# Patient Record
Sex: MALE | Race: WHITE | Employment: FULL TIME | URBAN - METROPOLITAN AREA
[De-identification: names, ages, dates, MRNs, and addresses within clinical notes are randomized per-mention and may not be internally consistent; named-entity substitution may affect disease eponyms.]

---

## 2017-01-23 ENCOUNTER — ANTICOAGULATION THERAPY VISIT (OUTPATIENT)
Dept: NURSING | Facility: CLINIC | Age: 52
End: 2017-01-23
Payer: COMMERCIAL

## 2017-01-23 DIAGNOSIS — I26.99 OTHER PULMONARY EMBOLISM WITHOUT ACUTE COR PULMONALE, UNSPECIFIED CHRONICITY (H): ICD-10-CM

## 2017-01-23 DIAGNOSIS — I26.99 PULMONARY EMBOLISM (H): ICD-10-CM

## 2017-01-23 DIAGNOSIS — Z79.01 LONG-TERM (CURRENT) USE OF ANTICOAGULANTS: Primary | ICD-10-CM

## 2017-01-23 LAB — INR POINT OF CARE: 2.4 (ref 0.86–1.14)

## 2017-01-23 PROCEDURE — 99207 ZZC NO CHARGE NURSE ONLY: CPT

## 2017-01-23 PROCEDURE — 36416 COLLJ CAPILLARY BLOOD SPEC: CPT

## 2017-01-23 PROCEDURE — 85610 PROTHROMBIN TIME: CPT | Mod: QW

## 2017-01-23 RX ORDER — WARFARIN SODIUM 5 MG/1
TABLET ORAL
Qty: 135 TABLET | Refills: 0 | Status: SHIPPED | OUTPATIENT
Start: 2017-01-23 | End: 2017-06-22

## 2017-01-23 NOTE — MR AVS SNAPSHOT
Arslan Clinton   1/23/2017 7:15 AM   Anticoagulation Therapy Visit    Description:  52 year old male   Provider:  AN ANTI COAG   Department:  An Nurse           INR as of 1/23/2017     Selected INR 2.4 (1/23/2017)      Anticoagulation Summary as of 1/23/2017     INR goal 2.0-3.0   Selected INR 2.4 (1/23/2017)   Full instructions 5 mg on Mon, Wed, Fri; 7.5 mg all other days   Next INR check 2/27/2017    Indications   Pulmonary embolism (H) [I26.99]  Long-term (current) use of anticoagulants [Z79.01] [Z79.01]         Your next Anticoagulation Clinic appointment(s)     Feb 27, 2017  7:15 AM   Anticoagulation Visit with AN ANTI [facility]AG   [facility]    [address] [facility] [address] [phone]              Contact Numbers     [facility]  Please call  [phone] to cancel and/or reschedule your appointment, or with any problems or questions regarding your therapy.        January 2017 Details    Sun Mon Tue Wed Thu Fri Sat     1               2               3               4               5               6               7                 8               9               10               11               12               13               14                 15               16               17               18               19               20               21                 22               23      5 mg   See details      24      7.5 mg         25      5 mg         26      7.5 mg         27      5 mg         28      7.5 mg           29      7.5 mg         30      5 mg         31      7.5 mg              Date Details   01/23 This INR check               How to take your warfarin dose     To take:  5 mg Take 1 of the 5 mg tablets.    To take:  7.5 mg Take 1.5 of the 5 mg tablets.           February 2017 Details    Sun Mon Tue Wed Thu Fri Sat        1      5 mg         2      7.5 mg         3      5 mg         4      7.5 mg           5      7.5 mg         6       5 mg         7      7.5 mg         8      5 mg         9      7.5 mg         10      5 mg         11      7.5 mg           12      7.5 mg         13      5 mg         14      7.5 mg         15      5 mg         16      7.5 mg         17      5 mg         18      7.5 mg           19      7.5 mg         20      5 mg         21      7.5 mg         22      5 mg         23      7.5 mg         24      5 mg         25      7.5 mg           26      7.5 mg         27            28                    Date Details   No additional details    Date of next INR:  2/27/2017         How to take your warfarin dose     To take:  5 mg Take 1 of the 5 mg tablets.    To take:  7.5 mg Take 1.5 of the 5 mg tablets.

## 2017-01-23 NOTE — PROGRESS NOTES
ANTICOAGULATION FOLLOW-UP CLINIC VISIT    Patient Name:  Arslan Clinton  Date:  1/23/2017  Contact Type:  Face to Face    SUBJECTIVE:     Patient Findings     Positives No Problem Findings           OBJECTIVE    INR PROTIME   Date Value Ref Range Status   01/23/2017 2.4* 0.86 - 1.14 Final       ASSESSMENT / PLAN  INR assessment THER    Recheck INR In: 5 WEEKS    INR Location Clinic      Anticoagulation Summary as of 1/23/2017     INR goal 2.0-3.0   Selected INR 2.4 (1/23/2017)   Maintenance plan 5 mg (5 mg x 1) on Mon, Wed, Fri; 7.5 mg (5 mg x 1.5) all other days   Full instructions 5 mg on Mon, Wed, Fri; 7.5 mg all other days   Weekly total 45 mg   No change documented Tierra Olivarez RN   Plan last modified Tierra Olivarez RN (10/7/2016)   Next INR check 2/27/2017   Target end date     Indications   Pulmonary embolism (H) [I26.99]  Long-term (current) use of anticoagulants [Z79.01] [Z79.01]         Anticoagulation Episode Summary     INR check location     Preferred lab     Send INR reminders to AN ANTICOAG CLINIC    Comments       Anticoagulation Care Providers     Provider Role Specialty Phone number    Cedric Hodgson PA-C [facility] [phone]            See the Encounter Report to view Anticoagulation Flowsheet and Dosing Calendar (Go to Encounters tab in chart review, and find the Anticoagulation Therapy Visit)        Tierra Olivarez RN

## 2017-02-27 ENCOUNTER — ANTICOAGULATION THERAPY VISIT (OUTPATIENT)
Dept: NURSING | Facility: CLINIC | Age: 52
End: 2017-02-27
Payer: COMMERCIAL

## 2017-02-27 DIAGNOSIS — Z79.01 LONG-TERM (CURRENT) USE OF ANTICOAGULANTS: ICD-10-CM

## 2017-02-27 DIAGNOSIS — I26.99 OTHER PULMONARY EMBOLISM WITHOUT ACUTE COR PULMONALE, UNSPECIFIED CHRONICITY (H): ICD-10-CM

## 2017-02-27 LAB — INR POINT OF CARE: 2.2 (ref 0.86–1.14)

## 2017-02-27 PROCEDURE — 99207 ZZC NO CHARGE NURSE ONLY: CPT

## 2017-02-27 PROCEDURE — 36416 COLLJ CAPILLARY BLOOD SPEC: CPT

## 2017-02-27 PROCEDURE — 85610 PROTHROMBIN TIME: CPT | Mod: QW

## 2017-02-27 NOTE — MR AVS SNAPSHOT
Arslan Clinton   2/27/2017 7:15 AM   Anticoagulation Therapy Visit    Description:  52 year old male   Provider:  AN ANTI COAG   Department:  An Nurse           INR as of 2/27/2017     Today's INR 2.2      Anticoagulation Summary as of 2/27/2017     INR goal 2.0-3.0   Today's INR 2.2   Full instructions 5 mg on Mon, Wed, Fri; 7.5 mg all other days   Next INR check 4/3/2017    Indications   Pulmonary embolism (H) [I26.99]  Long-term (current) use of anticoagulants [Z79.01] [Z79.01]         Your next Anticoagulation Clinic appointment(s)     Apr 03, 2017  7:15 AM CDT   Anticoagulation Visit with AN ANTI COAG   [facility]    [address] [facility] [address] [phone]              Contact Numbers     [facility]  Please call  [phone] to cancel and/or reschedule your appointment, or with any problems or questions regarding your therapy.        February 2017 Details    Sun Mon Tue Wed Thu Fri Sat        1               2               3               4                 5               6               7               8               9               10               11                 12               13               14               15               16               17               18                 19               20               21               22               23               24               25                 26               27      5 mg   See details      28      7.5 mg              Date Details   02/27 This INR check               How to take your warfarin dose     To take:  5 mg Take 1 of the 5 mg tablets.    To take:  7.5 mg Take 1.5 of the 5 mg tablets.           March 2017 Details    Sun Mon Tue Wed Thu Fri Sat        1      5 mg         2      7.5 mg         3      5 mg         4      7.5 mg           5      7.5 mg         6      5 mg         7      7.5 mg         8      5 mg         9      7.5 mg         10      5 mg         11       7.5 mg           12      7.5 mg         13      5 mg         14      7.5 mg         15      5 mg         16      7.5 mg         17      5 mg         18      7.5 mg           19      7.5 mg         20      5 mg         21      7.5 mg         22      5 mg         23      7.5 mg         24      5 mg         25      7.5 mg           26      7.5 mg         27      5 mg         28      7.5 mg         29      5 mg         30      7.5 mg         31      5 mg           Date Details   No additional details            How to take your warfarin dose     To take:  5 mg Take 1 of the 5 mg tablets.    To take:  7.5 mg Take 1.5 of the 5 mg tablets.           April 2017 Details    Sun Mon Tue Wed Thu Fri Sat           1      7.5 mg           2      7.5 mg         3            4               5               6               7               8                 9               10               11               12               13               14               15                 16               17               18               19               20               21               22                 23               24               25               26               27               28               29                 30                      Date Details   No additional details    Date of next INR:  4/3/2017         How to take your warfarin dose     To take:  5 mg Take 1 of the 5 mg tablets.    To take:  7.5 mg Take 1.5 of the 5 mg tablets.

## 2017-02-27 NOTE — PROGRESS NOTES
ANTICOAGULATION FOLLOW-UP CLINIC VISIT    Patient Name:  Arslan Clinton  Date:  2/27/2017  Contact Type:  Face to Face    SUBJECTIVE:     Patient Findings     Positives No Problem Findings    Comments Therapeutic. Continue same.           OBJECTIVE    INR Protime   Date Value Ref Range Status   02/27/2017 2.2 (A) 0.86 - 1.14 Final       ASSESSMENT / PLAN  INR assessment THER    Recheck INR In: 5 WEEKS    INR Location Clinic      Anticoagulation Summary as of 2/27/2017     INR goal 2.0-3.0   Today's INR 2.2   Maintenance plan 5 mg (5 mg x 1) on Mon, Wed, Fri; 7.5 mg (5 mg x 1.5) all other days   Full instructions 5 mg on Mon, Wed, Fri; 7.5 mg all other days   Weekly total 45 mg   No change documented Tierra Olivarez RN   Plan last modified Tierra Olivarez RN (10/7/2016)   Next INR check 4/3/2017   Target end date     Indications   Pulmonary embolism (H) [I26.99]  Long-term (current) use of anticoagulants [Z79.01] [Z79.01]         Anticoagulation Episode Summary     INR check location     Preferred lab     Send INR reminders to AN ANTICOAG CLINIC    Comments       Anticoagulation Care Providers     Provider Role Specialty Phone number    Cedric Hodgson PA-C [facility] [phone]            See the Encounter Report to view Anticoagulation Flowsheet and Dosing Calendar (Go to Encounters tab in chart review, and find the Anticoagulation Therapy Visit)        Tierra Olivarez RN

## 2017-03-01 DIAGNOSIS — M54.2 PAIN, NECK: ICD-10-CM

## 2017-03-01 DIAGNOSIS — R56.9 CONVULSIONS (H): ICD-10-CM

## 2017-03-01 DIAGNOSIS — R41.3 MEMORY LOSS: ICD-10-CM

## 2017-03-01 DIAGNOSIS — R20.2 HAND PARESTHESIA: Primary | ICD-10-CM

## 2017-03-01 PROCEDURE — 99000 SPECIMEN HANDLING OFFICE-LAB: CPT | Performed by: PSYCHIATRY & NEUROLOGY

## 2017-03-01 PROCEDURE — 80186 ASSAY OF PHENYTOIN FREE: CPT | Mod: 90 | Performed by: PSYCHIATRY & NEUROLOGY

## 2017-03-01 PROCEDURE — 36415 COLL VENOUS BLD VENIPUNCTURE: CPT | Performed by: PSYCHIATRY & NEUROLOGY

## 2017-03-02 LAB — PHENYTOIN FREE SERPL-MCNC: 1.45 UG/ML

## 2017-04-03 ENCOUNTER — ANTICOAGULATION THERAPY VISIT (OUTPATIENT)
Dept: NURSING | Facility: CLINIC | Age: 52
End: 2017-04-03
Payer: COMMERCIAL

## 2017-04-03 DIAGNOSIS — I26.99 OTHER PULMONARY EMBOLISM WITHOUT ACUTE COR PULMONALE, UNSPECIFIED CHRONICITY (H): ICD-10-CM

## 2017-04-03 DIAGNOSIS — Z79.01 LONG-TERM (CURRENT) USE OF ANTICOAGULANTS: ICD-10-CM

## 2017-04-03 LAB — INR POINT OF CARE: 2.6 (ref 0.86–1.14)

## 2017-04-03 PROCEDURE — 36416 COLLJ CAPILLARY BLOOD SPEC: CPT

## 2017-04-03 PROCEDURE — 85610 PROTHROMBIN TIME: CPT | Mod: QW

## 2017-04-03 PROCEDURE — 99207 ZZC NO CHARGE NURSE ONLY: CPT

## 2017-04-03 NOTE — MR AVS SNAPSHOT
Arslan Clinton   4/3/2017 7:15 AM   Anticoagulation Therapy Visit    Description:  52 year old male   Provider:  AN ANTI COAG   Department:  An Nurse           INR as of 4/3/2017     Today's INR 2.6      Anticoagulation Summary as of 4/3/2017     INR goal 2.0-3.0   Today's INR 2.6   Full instructions 5 mg on Mon, Wed, Fri; 7.5 mg all other days   Next INR check 5/5/2017    Indications   Pulmonary embolism (H) [I26.99]  Long-term (current) use of anticoagulants [Z79.01] [Z79.01]         Your next Anticoagulation Clinic appointment(s)     Apr 03, 2017  7:15 AM CDT   Anticoagulation Visit with AN ANTI COAG   [facility] ([facility]    [address] [facility] [address] [phone]            May 05, 2017  7:15 AM CDT   Anticoagulation Visit with AN ANTI COAG   [facility] ([facility])    [address] [facility] [address] [phone]              Contact Numbers     [facility]  Please call  [phone] to cancel and/or reschedule your appointment, or with any problems or questions regarding your therapy.        April 2017 Details    Sun Mon Tue Wed Thu Fri Sat           1                 2               3      5 mg   See details      4      7.5 mg         5      5 mg         6      7.5 mg         7      5 mg         8      7.5 mg           9      7.5 mg         10      5 mg         11      7.5 mg         12      5 mg         13      7.5 mg         14      5 mg         15      7.5 mg           16      7.5 mg         17      5 mg         18      7.5 mg         19      5 mg         20      7.5 mg         21      5 mg         22      7.5 mg           23      7.5 mg         24      5 mg         25      7.5 mg         26      5 mg         27      7.5 mg         28      5 mg         29      7.5 mg           30      7.5 mg                Date Details   04/03 This INR check               How to take your warfarin dose     To take:  5 mg  Take 1 of the 5 mg tablets.    To take:  7.5 mg Take 1.5 of the 5 mg tablets.           May 2017 Details    Sun Mon Tue Wed Thu Fri Sat      1      5 mg         2      7.5 mg         3      5 mg         4      7.5 mg         5            6                 7               8               9               10               11               12               13                 14               15               16               17               18               19               20                 21               22               23               24               25               26               27                 28               29               30               31                   Date Details   No additional details    Date of next INR:  5/5/2017         How to take your warfarin dose     To take:  5 mg Take 1 of the 5 mg tablets.    To take:  7.5 mg Take 1.5 of the 5 mg tablets.

## 2017-04-03 NOTE — PROGRESS NOTES
ANTICOAGULATION FOLLOW-UP CLINIC VISIT    Patient Name:  Arslan Clinton  Date:  4/3/2017  Contact Type:  Face to Face    SUBJECTIVE:     Patient Findings     Positives No Problem Findings           OBJECTIVE    INR Protime   Date Value Ref Range Status   04/03/2017 2.6 (A) 0.86 - 1.14 Final       ASSESSMENT / PLAN  INR assessment THER    Recheck INR In: 5 WEEKS    INR Location Clinic      Anticoagulation Summary as of 4/3/2017     INR goal 2.0-3.0   Today's INR 2.6   Maintenance plan 5 mg (5 mg x 1) on Mon, Wed, Fri; 7.5 mg (5 mg x 1.5) all other days   Full instructions 5 mg on Mon, Wed, Fri; 7.5 mg all other days   Weekly total 45 mg   No change documented Tierra Olivarez RN   Plan last modified Tierra Olivarez RN (10/7/2016)   Next INR check 5/5/2017   Target end date     Indications   Pulmonary embolism (H) [I26.99]  Long-term (current) use of anticoagulants [Z79.01] [Z79.01]         Anticoagulation Episode Summary     INR check location     Preferred lab     Send INR reminders to AN ANTICOAG CLINIC    Comments       Anticoagulation Care Providers     Provider Role Specialty Phone number    Cedric Hodgson PA-C [facility] [phone]            See the Encounter Report to view Anticoagulation Flowsheet and Dosing Calendar (Go to Encounters tab in chart review, and find the Anticoagulation Therapy Visit)        Tierra Olivarez RN

## 2017-04-10 ENCOUNTER — TELEPHONE (OUTPATIENT)
Dept: FAMILY MEDICINE | Facility: CLINIC | Age: 52
End: 2017-04-10

## 2017-04-10 NOTE — TELEPHONE ENCOUNTER
Patient has been having shortness of breath and also feeling dizzy/light headed.   Please advise.  Thank you

## 2017-04-10 NOTE — TELEPHONE ENCOUNTER
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  States in last couple weeks he's noticed that he would move (a slight move) and become very short of breath and feel like he was going to pass out but didn't.  This weekend was helping someone move some \"stuff\" and noticed he'd have to stop and rest due to shortness of breath.   He states he is now sick with nasal congestion but he's had the intermittent shortness of breath long term.  Had noticed more so in Winter time or earlier in Spring.  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  He says had had PE prior but had gone off his warfarin.   Has not stopped his warfarin.  Recent INR's have been in normal.  He states doesn't feel like when he's had a PE in past.  He says it is not constant.  Walked the other night with no breathing issues.  He says has had times where it will last a couple innings; other times more briefly.  He is not having any chest pains.  Denies any issues with racing, fluttering, or irregular heartbeats.  Then states had been cardioverted when in hospital with PE.  States had atrial flutter.  Refuses ED.  States can't come in tomorrow due to other obligations.  Appointment Wednesday with Cedric Hodgson PA-C.  Says will go to ED if has recurrence or worsening of symptoms.  Kylah Severino RN    "

## 2017-04-11 NOTE — PROGRESS NOTES
SUBJECTIVE:                                                    Arslan Clinton is a 52 year old male who presents to clinic today for the following health issues:    Shortness of breath      Duration: over the last year - happening more often recently     Description (location/character/radiation): gets short of breath    Intensity:  Variable     Accompanying signs and symptoms: light headed and feels like he is going to pass out at times    History (similar episodes/previous evaluation): pt has history of blood clot - worried this is happening again. Had pain in his right thigh yesterday - was sitting a lot      Precipitating or alleviating factors: None    Therapies tried and outcome: has to take deep breaths in    History of PE and is on coumadin. History of atrial flutter at time of diagnosis. States he was cardioverted into normal rhythm. No symptoms since.  Gradual onset of short of breath and dizziness. 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 Or sometime when moving boxes and exerting himself. No chest pains.     History of seizures, see's neurology.  History of thyroid nodule with US 2015. He didn't recall having that done.   Was to follow up with ENT which he didn't .  Care Everywhere Reviewed    Problem list and histories reviewed & adjusted, as indicated.  Additional history: as documented    Patient Active Problem List   Diagnosis     Other acquired deformity of other parts of limb     CARDIOVASCULAR SCREENING; LDL GOAL LESS THAN 160     Tremor     Weight loss     Varicose veins of lower extremities with complications     Personal history of venous thrombosis and embolism     Right leg pain     Superficial thrombophlebitis     Pulmonary embolism (H)     Thyroid nodule     Thyroid cyst     Family history of colon cancer     Long-term (current) use of anticoagulants [Z79.01]     Other pulmonary embolism without acute cor pulmonale, unspecified chronicity (H)      Atrial flutter, unspecified type (H)     Convulsions, unspecified convulsion type (H)     Past Surgical History:   Procedure Laterality Date     ARTHROSCOPY KNEE RT/LT       HERNIA REPAIR, INGUINAL RT/LT      Hernia Repair, RT/LT       Social History   Substance Use Topics     Smoking status: Never Smoker     Smokeless tobacco: Never Used     Alcohol use Yes      Comment: rare     Family History   Problem Relation Age of Onset     HEART DISEASE Mother      Cancer - colorectal Father      late 60's     Thyroid Disease Father      HEART DISEASE Other      CANCER Other      HEART DISEASE Sister          Current Outpatient Prescriptions   Medication Sig Dispense Refill     fluticasone (FLONASE) 50 MCG/ACT spray Spray 1 spray into both nostrils daily       metoprolol (TOPROL-XL) 25 MG 24 hr tablet Take 1 tablet (25 mg) by mouth daily 30 tablet 1     warfarin (COUMADIN) 5 MG tablet Take daily as directed. Current dose 5 mg M,W,F and  7.5 mg Su,Tu,TH,Sa. 135 tablet 0     Acetaminophen (TYLENOL EXTRA STRENGTH PO) Take 2 tablets by mouth every 6 hours as needed       phenytoin (DILANTIN) 100 MG ER capsule Take 5 capsules by mouth daily.       Allergies   Allergen Reactions     Penicillins      BP Readings from Last 3 Encounters:   04/12/17 99/76   03/24/16 118/76   02/13/15 108/78    Wt Readings from Last 3 Encounters:   04/12/17 193 lb (87.5 kg)   03/24/16 193 lb (87.5 kg)   02/13/15 184 lb (83.5 kg)            Labs reviewed in EPIC    ROS:  Constitutional, HEENT, cardiovascular, pulmonary, gi and gu systems are negative, except as otherwise noted.    OBJECTIVE:                                                    BP 99/76  Pulse 109  Wt 193 lb (87.5 kg)  SpO2 98%  BMI 29.13 kg/m2  Body mass index is 29.13 kg/(m^2).  GENERAL: healthy, alert and no distress  EYES: Eyes grossly normal to inspection, PERRL and conjunctivae and sclerae normal  HENT: ear canals and TM's normal, nose and mouth without ulcers or lesions  NECK:  no adenopathy, no asymmetry, masses, or scars and thyroid left enlarged nodule to palpation  RESP: lungs clear to auscultation - no rales, rhonchi or wheezes  CV:  irregular rate and rhythm, tachycardic,  normal S1 S2, no S3 or S4, no murmur, click or rub, no peripheral edema and peripheral pulses strong  ABDOMEN: soft, nontender, no hepatosplenomegaly, no masses and bowel sounds normal  MS: no gross musculoskeletal defects noted, no edema    Diagnostic Test Results:  Results for orders placed or performed in visit on 04/12/17 (from the past 24 hour(s))   CBC with platelets differential   Result Value Ref Range    WBC 6.3 4.0 - 11.0 10e9/L    RBC Count 5.13 4.4 - 5.9 10e12/L    Hemoglobin 15.1 13.3 - 17.7 g/dL    Hematocrit 44.9 40.0 - 53.0 %    MCV 88 78 - 100 fl    MCH 29.4 26.5 - 33.0 pg    MCHC 33.6 31.5 - 36.5 g/dL    RDW 13.2 10.0 - 15.0 %    Platelet Count 213 150 - 450 10e9/L    Diff Method Automated Method     % Neutrophils 57.8 %    % Lymphocytes 31.1 %    % Monocytes 10.1 %    % Eosinophils 0.8 %    % Basophils 0.2 %    Absolute Neutrophil 3.7 1.6 - 8.3 10e9/L    Absolute Lymphocytes 2.0 0.8 - 5.3 10e9/L    Absolute Monocytes 0.6 0.0 - 1.3 10e9/L    Absolute Eosinophils 0.1 0.0 - 0.7 10e9/L    Absolute Basophils 0.0 0.0 - 0.2 10e9/L     EKG - atrial flutter        ASSESSMENT/PLAN:                                                        ICD-10-CM    1. SOB (shortness of breath) R06.02 CBC with platelets differential     Comprehensive metabolic panel     US Thyroid     EKG 12-lead complete w/read - Clinics     CARDIOLOGY EVAL ADULT REFERRAL   2. Dizziness R42 CBC with platelets differential     Comprehensive metabolic panel     US Thyroid     CARDIOLOGY EVAL ADULT REFERRAL   3. Thyroid nodule E04.1 TSH with free T4 reflex     OTOLARYNGOLOGY REFERRAL   4. Personal history of venous thrombosis and embolism Z86.718    5. Long-term (current) use of anticoagulants [Z79.01] Z79.01    6. Atrial flutter, unspecified  type (H) I48.92 CARDIOLOGY EVAL ADULT REFERRAL     metoprolol (TOPROL-XL) 25 MG 24 hr tablet   7. Convulsions, unspecified convulsion type (H) R56.9    start metoprolol 25 mg daily.   warning signs discussed. side effects discussed  If symptoms increase go to ED.  Follow up  With cardiology 1 wk.   Get US updated on thyroid, labs pending. Follow up  With ENT.  Discussed with Dr. Zhen Hodgson PA-C  [facility]

## 2017-04-12 ENCOUNTER — OFFICE VISIT (OUTPATIENT)
Dept: FAMILY MEDICINE | Facility: CLINIC | Age: 52
End: 2017-04-12
Payer: COMMERCIAL

## 2017-04-12 VITALS
DIASTOLIC BLOOD PRESSURE: 76 MMHG | OXYGEN SATURATION: 98 % | HEART RATE: 109 BPM | WEIGHT: 193 LBS | BODY MASS INDEX: 29.13 KG/M2 | SYSTOLIC BLOOD PRESSURE: 99 MMHG

## 2017-04-12 DIAGNOSIS — R56.9 CONVULSIONS, UNSPECIFIED CONVULSION TYPE (H): ICD-10-CM

## 2017-04-12 DIAGNOSIS — I48.92 ATRIAL FLUTTER, UNSPECIFIED TYPE (H): ICD-10-CM

## 2017-04-12 DIAGNOSIS — Z79.01 LONG-TERM (CURRENT) USE OF ANTICOAGULANTS: ICD-10-CM

## 2017-04-12 DIAGNOSIS — R06.02 SOB (SHORTNESS OF BREATH): Primary | ICD-10-CM

## 2017-04-12 DIAGNOSIS — E04.1 THYROID NODULE: ICD-10-CM

## 2017-04-12 DIAGNOSIS — Z86.718 PERSONAL HISTORY OF VENOUS THROMBOSIS AND EMBOLISM: ICD-10-CM

## 2017-04-12 DIAGNOSIS — R42 DIZZINESS: ICD-10-CM

## 2017-04-12 LAB
ALBUMIN SERPL-MCNC: 4.1 G/DL (ref 3.4–5)
ALP SERPL-CCNC: 85 U/L (ref 40–150)
ALT SERPL W P-5'-P-CCNC: 42 U/L (ref 0–70)
ANION GAP SERPL CALCULATED.3IONS-SCNC: 8 MMOL/L (ref 3–14)
AST SERPL W P-5'-P-CCNC: 24 U/L (ref 0–45)
BASOPHILS # BLD AUTO: 0 10E9/L (ref 0–0.2)
BASOPHILS NFR BLD AUTO: 0.2 %
BILIRUB SERPL-MCNC: 0.4 MG/DL (ref 0.2–1.3)
BUN SERPL-MCNC: 15 MG/DL (ref 7–30)
CALCIUM SERPL-MCNC: 9 MG/DL (ref 8.5–10.1)
CHLORIDE SERPL-SCNC: 104 MMOL/L (ref 94–109)
CO2 SERPL-SCNC: 27 MMOL/L (ref 20–32)
CREAT SERPL-MCNC: 1.07 MG/DL (ref 0.66–1.25)
DIFFERENTIAL METHOD BLD: NORMAL
EOSINOPHIL # BLD AUTO: 0.1 10E9/L (ref 0–0.7)
EOSINOPHIL NFR BLD AUTO: 0.8 %
ERYTHROCYTE [DISTWIDTH] IN BLOOD BY AUTOMATED COUNT: 13.2 % (ref 10–15)
GFR SERPL CREATININE-BSD FRML MDRD: 72 ML/MIN/1.7M2
GLUCOSE SERPL-MCNC: 89 MG/DL (ref 70–99)
HCT VFR BLD AUTO: 44.9 % (ref 40–53)
HGB BLD-MCNC: 15.1 G/DL (ref 13.3–17.7)
LYMPHOCYTES # BLD AUTO: 2 10E9/L (ref 0.8–5.3)
LYMPHOCYTES NFR BLD AUTO: 31.1 %
MCH RBC QN AUTO: 29.4 PG (ref 26.5–33)
MCHC RBC AUTO-ENTMCNC: 33.6 G/DL (ref 31.5–36.5)
MCV RBC AUTO: 88 FL (ref 78–100)
MONOCYTES # BLD AUTO: 0.6 10E9/L (ref 0–1.3)
MONOCYTES NFR BLD AUTO: 10.1 %
NEUTROPHILS # BLD AUTO: 3.7 10E9/L (ref 1.6–8.3)
NEUTROPHILS NFR BLD AUTO: 57.8 %
PLATELET # BLD AUTO: 213 10E9/L (ref 150–450)
POTASSIUM SERPL-SCNC: 4.2 MMOL/L (ref 3.4–5.3)
PROT SERPL-MCNC: 7.8 G/DL (ref 6.8–8.8)
RBC # BLD AUTO: 5.13 10E12/L (ref 4.4–5.9)
SODIUM SERPL-SCNC: 139 MMOL/L (ref 133–144)
TSH SERPL DL<=0.005 MIU/L-ACNC: 1.24 MU/L (ref 0.4–4)
WBC # BLD AUTO: 6.3 10E9/L (ref 4–11)

## 2017-04-12 PROCEDURE — 93000 ELECTROCARDIOGRAM COMPLETE: CPT | Performed by: PHYSICIAN ASSISTANT

## 2017-04-12 PROCEDURE — 80050 GENERAL HEALTH PANEL: CPT | Performed by: PHYSICIAN ASSISTANT

## 2017-04-12 PROCEDURE — 36415 COLL VENOUS BLD VENIPUNCTURE: CPT | Performed by: PHYSICIAN ASSISTANT

## 2017-04-12 PROCEDURE — 99214 OFFICE O/P EST MOD 30 MIN: CPT | Mod: 25 | Performed by: PHYSICIAN ASSISTANT

## 2017-04-12 RX ORDER — METOPROLOL SUCCINATE 25 MG/1
25 TABLET, EXTENDED RELEASE ORAL DAILY
Qty: 30 TABLET | Refills: 1 | Status: SHIPPED | OUTPATIENT
Start: 2017-04-12 | End: 2018-07-25

## 2017-04-12 RX ORDER — FLUTICASONE PROPIONATE 50 MCG
1 SPRAY, SUSPENSION (ML) NASAL DAILY
COMMUNITY
End: 2017-06-05

## 2017-04-12 NOTE — NURSING NOTE
"Chief Complaint   Patient presents with     Shortness of Breath       Initial BP 99/76  Pulse 109  Wt 193 lb (87.5 kg)  SpO2 98%  BMI 29.13 kg/m2 Estimated body mass index is 29.13 kg/(m^2) as calculated from the following:    Height as of 3/24/16: 5' 8.25\" (1.734 m).    Weight as of this encounter: 193 lb (87.5 kg).  Medication Reconciliation: complete  Jessica Martini CMA    "

## 2017-04-12 NOTE — MR AVS SNAPSHOT
After Visit Summary   4/12/2017    Arslan Clinton    MRN: [id number]           Patient Information     Date Of Birth          1965        Visit Information        Provider Department      4/12/2017 10:40 AM Cedric Hodgson PA-C [facility]        Today's Diagnoses     SOB (shortness of breath)    -  1    Dizziness        Thyroid nodule        Personal history of venous thrombosis and embolism        Long-term (current) use of anticoagulants [Z79.01]        Atrial flutter, unspecified type (H)        Convulsions, unspecified convulsion type (H)           Follow-ups after your visit        Additional Services     CARDIOLOGY EVAL ADULT REFERRAL       Your provider has referred you to:  [facility]: [facility] [phone]   [email or web address]    Please be aware that coverage of these services is subject to the terms and limitations of your health insurance plan.  Call member services at your health plan with any benefit or coverage questions.      Type of Referral:  New Cardiology Consult    Timeframe requested:  Less than 1 week    Please bring the following to your appointment:  >>   Any x-rays, CTs or MRIs which have been performed.  Contact the facility where they were done to arrange for  prior to your scheduled appointment.    >>   List of current medications  >>   This referral request   >>   Any documents/labs given to you for this referral            OTOLARYNGOLOGY REFERRAL       Your provider has referred you to: [facility]: [facility] [phone]   [email or web address]    Please be aware that coverage of these services is subject to the terms and limitations of your health insurance plan.  Call member services at your health plan with any benefit or coverage questions.      Please bring the following with you to your appointment:    (1) Any X-Rays, CTs or  "MRIs which have been performed.  Contact the facility where they were done to arrange for  prior to your scheduled appointment.   (2) List of current medications  (3) This referral request   (4) Any documents/labs given to you for this referral                  Your next 10 appointments already scheduled     Apr 18, 2017  7:00 AM CDT   [facility]ed[facility]    [address] [facility]  [place] [address] [phone]            May 05, 2017  7:15 AM CDT   Anticoagulation Visit with AN ANTI COAG   [facility]    [address] [facility] [address] [phone]              Future tests that were ordered for you today     Open Future Orders        Priority Expected Expires Ordered    US Thyroid Routine  4/12/2018 4/12/2017            Who to contact     If you have questions or need follow up information about today's clinic visit or your schedule please contact [facility] directly at [phone].  Normal or non-critical lab and imaging results will be communicated to you by [employer], letter or phone within 4 business days after the clinic has received the results. If you do not hear from us within 7 days, please contact the clinic through [employer] or phone. If you have a critical or abnormal lab result, we will notify you by phone as soon as possible.  Submit refill requests through [employer] or call your pharmacy and they will forward the refill request to us. Please allow 3 business days for your refill to be completed.          Additional Information About Your Visit        [employer] Information     [employer] lets you send messages to your doctor, view your test results, renew your prescriptions, schedule appointments and more. To sign up, go to [email or web address] . Click on \"Log in\" on the left side of the screen, which will take you to the Welcome page. Then click on " "\"Sign up Now\" on the right side of the page.     You will be asked to enter the access code listed below, as well as some personal information. Please follow the directions to create your username and password.     Your access code is: [email or web address]  Expires: 2017 11:45 AM     Your access code will  in 90 days. If you need help or a new code, please call your [facility] or [phone].        Care EveryWhere ID     This is your Care EveryWhere ID. This could be used by other organizations to access your [facility] medical records  [id number]        Your Vitals Were     Pulse Pulse Oximetry BMI (Body Mass Index)             109 98% 29.13 kg/m2          Blood Pressure from Last 3 Encounters:   17 99/76   16 118/76   02/13/15 108/78    Weight from Last 3 Encounters:   17 193 lb (87.5 kg)   16 193 lb (87.5 kg)   02/13/15 184 lb (83.5 kg)              We Performed the Following     CARDIOLOGY EVAL ADULT REFERRAL     CBC with platelets differential     Comprehensive metabolic panel     EKG 12-lead complete w/read - Clinics     OTOLARYNGOLOGY REFERRAL     TSH with free T4 reflex          Today's Medication Changes          These changes are accurate as of: 17 11:45 AM.  If you have any questions, ask your nurse or doctor.               Start taking these medicines.        Dose/Directions    metoprolol 25 MG 24 hr tablet   Commonly known as:  TOPROL-XL   Used for:  Atrial flutter, unspecified type (H)   Started by:  Cedric Hodgson PA-C        Dose:  25 mg   Take 1 tablet (25 mg) by mouth daily   Quantity:  30 tablet   Refills:  1            Where to get your medicines      These medications were sent to [facility]/pharmacy #[id number] - [place] -  [address] [place] [address]     Phone:  [phone]     metoprolol 25 MG 24 hr tablet                Primary Care Provider Office Phone # Fax #    Cedric Hodgson PA-C " [phone]       [facility] [address] [facility] [address]        Thank you!     Thank you for choosing [facility]  for your care. Our goal is always to provide you with excellent care. Hearing back from our patients is one way we can continue to improve our services. Please take a few minutes to complete the written survey that you may receive in the mail after your visit with us. Thank you!             Your Updated Medication List - Protect others around you: Learn how to safely use, store and throw away your medicines at www.disposemymeds.org.          This list is accurate as of: 4/12/17 11:45 AM.  Always use your most recent med list.                   Brand Name Dispense Instructions for use    DILANTIN 100 MG CR capsule   Generic drug:  phenytoin      Take 5 capsules by mouth daily.       fluticasone 50 MCG/ACT spray    FLONASE     Spray 1 spray into both nostrils daily       metoprolol 25 MG 24 hr tablet    TOPROL-XL    30 tablet    Take 1 tablet (25 mg) by mouth daily       TYLENOL EXTRA STRENGTH PO      Take 2 tablets by mouth every 6 hours as needed       warfarin 5 MG tablet    COUMADIN    135 tablet    Take daily as directed. Current dose 5 mg M,W,F and  7.5 mg Jenkins,Tu,TH,Sa.

## 2017-04-12 NOTE — LETTER
[facility]  [address] [facility] [address] [phone]        April 13, 2017    Arslan Clinton  [address] [address] [address]            Dear Arslan,    All of your labs were normal for you.     Please contact the clinic if you have additional questions.  Thank you.     Sincerely,     Cedric Hodgson PA-C/reshma    Results for orders placed or performed in visit on 04/12/17   TSH with free T4 reflex   Result Value Ref Range    TSH 1.24 0.40 - 4.00 mU/L   CBC with platelets differential   Result Value Ref Range    WBC 6.3 4.0 - 11.0 10e9/L    RBC Count 5.13 4.4 - 5.9 10e12/L    Hemoglobin 15.1 13.3 - 17.7 g/dL    Hematocrit 44.9 40.0 - 53.0 %    MCV 88 78 - 100 fl    MCH 29.4 26.5 - 33.0 pg    MCHC 33.6 31.5 - 36.5 g/dL    RDW 13.2 10.0 - 15.0 %    Platelet Count 213 150 - 450 10e9/L    Diff Method Automated Method     % Neutrophils 57.8 %    % Lymphocytes 31.1 %    % Monocytes 10.1 %    % Eosinophils 0.8 %    % Basophils 0.2 %    Absolute Neutrophil 3.7 1.6 - 8.3 10e9/L    Absolute Lymphocytes 2.0 0.8 - 5.3 10e9/L    Absolute Monocytes 0.6 0.0 - 1.3 10e9/L    Absolute Eosinophils 0.1 0.0 - 0.7 10e9/L    Absolute Basophils 0.0 0.0 - 0.2 10e9/L   Comprehensive metabolic panel   Result Value Ref Range    Sodium 139 133 - 144 mmol/L    Potassium 4.2 3.4 - 5.3 mmol/L    Chloride 104 94 - 109 mmol/L    Carbon Dioxide 27 20 - 32 mmol/L    Anion Gap 8 3 - 14 mmol/L    Glucose 89 70 - 99 mg/dL    Urea Nitrogen 15 7 - 30 mg/dL    Creatinine 1.07 0.66 - 1.25 mg/dL    GFR Estimate 72 >60 mL/min/1.7m2    GFR Estimate If Black 87 >60 mL/min/1.7m2    Calcium 9.0 8.5 - 10.1 mg/dL    Bilirubin Total 0.4 0.2 - 1.3 mg/dL    Albumin 4.1 3.4 - 5.0 g/dL    Protein Total 7.8 6.8 - 8.8 g/dL    Alkaline Phosphatase 85 40 - 150 U/L    ALT 42 0 - 70 U/L    AST 24 0 - 45 U/L

## 2017-04-13 NOTE — PROGRESS NOTES
MrKatelyn Clinton,    All of your labs were normal for you.    Please contact the clinic if you have additional questions.  Thank you.    Sincerely,    Cedric Hodgson PA-C

## 2017-04-18 ENCOUNTER — OFFICE VISIT (OUTPATIENT)
Dept: FAMILY MEDICINE | Facility: CLINIC | Age: 52
End: 2017-04-18
Payer: COMMERCIAL

## 2017-04-18 VITALS
WEIGHT: 191.6 LBS | HEIGHT: 69 IN | HEART RATE: 63 BPM | BODY MASS INDEX: 28.38 KG/M2 | OXYGEN SATURATION: 100 % | DIASTOLIC BLOOD PRESSURE: 67 MMHG | SYSTOLIC BLOOD PRESSURE: 108 MMHG

## 2017-04-18 DIAGNOSIS — Z12.9 CANCER SCREENING: ICD-10-CM

## 2017-04-18 DIAGNOSIS — Z80.0 FAMILY HISTORY OF COLON CANCER: ICD-10-CM

## 2017-04-18 DIAGNOSIS — Z00.00 ROUTINE GENERAL MEDICAL EXAMINATION AT A HEALTH CARE FACILITY: Primary | ICD-10-CM

## 2017-04-18 DIAGNOSIS — M70.61 TROCHANTERIC BURSITIS OF RIGHT HIP: ICD-10-CM

## 2017-04-18 LAB
CHOLEST SERPL-MCNC: 210 MG/DL
HDLC SERPL-MCNC: 59 MG/DL
LDLC SERPL CALC-MCNC: 130 MG/DL
NONHDLC SERPL-MCNC: 151 MG/DL
TRIGL SERPL-MCNC: 105 MG/DL

## 2017-04-18 PROCEDURE — 99396 PREV VISIT EST AGE 40-64: CPT | Performed by: PHYSICIAN ASSISTANT

## 2017-04-18 PROCEDURE — 80061 LIPID PANEL: CPT | Performed by: PHYSICIAN ASSISTANT

## 2017-04-18 PROCEDURE — 36415 COLL VENOUS BLD VENIPUNCTURE: CPT | Performed by: PHYSICIAN ASSISTANT

## 2017-04-18 NOTE — LETTER
[facility]  [address] [facility] [address] [phone]        April 19, 2017    Arslan Clinton  [address] [address]            Mr. Clinton,     All of your labs were normal for you.     Please contact the clinic if you have additional questions.  Thank you.     Sincerely,     Cedric Hodgson PA-C/ks    Results for orders placed or performed in visit on 04/18/17   Lipid panel reflex to direct LDL   Result Value Ref Range    Cholesterol 210 (H) <200 mg/dL    Triglycerides 105 <150 mg/dL    HDL Cholesterol 59 >39 mg/dL    LDL Cholesterol Calculated 130 (H) <100 mg/dL    Non HDL Cholesterol 151 (H) <130 mg/dL

## 2017-04-18 NOTE — PATIENT INSTRUCTIONS
Preventive Health Recommendations  Male Ages 50   64    Yearly exam:             See your health care provider every year in order to  o   Review health changes.   o   Discuss preventive care.    o   Review your medicines if your doctor has prescribed any.     Have a cholesterol test every 5 years, or more frequently if you are at risk for high cholesterol/heart disease.     Have a diabetes test (fasting glucose) every three years. If you are at risk for diabetes, you should have this test more often.     Have a colonoscopy at age 50, or have a yearly FIT test (stool test). These exams will check for colon cancer.      Talk with your health care provider about whether or not a prostate cancer screening test (PSA) is right for you.    You should be tested each year for STDs (sexually transmitted diseases), if you re at risk.     Shots: Get a flu shot each year. Get a tetanus shot every 10 years.     Nutrition:    Eat at least 5 servings of fruits and vegetables daily.     Eat whole-grain bread, whole-wheat pasta and brown rice instead of white grains and rice.     Talk to your provider about Calcium and Vitamin D.     Lifestyle    Exercise for at least 150 minutes a week (30 minutes a day, 5 days a week). This will help you control your weight and prevent disease.     Limit alcohol to one drink per day.     No smoking.     Wear sunscreen to prevent skin cancer.     See your dentist every six months for an exam and cleaning.     See your eye doctor every 1 to 2 years.                   Trochanteric Bursitis           What is trochanteric bursitis?   Trochanteric bursitis is irritation or inflammation of the trochanteric bursa. A bursa is a fluid-filled sac that acts as a cushion between tendons, bones, and skin. The trochanteric bursa is located on the upper, outer area of the thigh. There is a bump on the outer side of the upper part of the thigh bone (femur) called the greater trochanter. The trochanteric bursa is  located over the greater trochanter.   How does it occur?   The trochanteric bursa may be inflamed by a group of muscles or tendons rubbing over the bursa and causing friction against the thigh bone. This injury can occur with running, walking, or bicycling, especially when the bicycle seat is too high.   What are the symptoms?   You have pain on the upper outer area of your thigh or in your hip. The pain is worse when you walk, bicycle, or go up or down stairs. You have pain when you move your thigh bone and feel tenderness in the area over the greater trochanter.   How is it diagnosed?   Your healthcare provider will ask about your symptoms and examine your hip and thigh.   How is it treated?   To treat this condition:   Put an ice pack, gel pack, or package of frozen vegetables, wrapped in a cloth on the area every 3 to 4 hours, for up to 20 minutes at a time.   Take an anti-inflammatory medicine such as ibuprofen, or other medicine as directed by your provider. Nonsteroidal anti-inflammatory medicines (NSAIDs) may cause stomach bleeding and other problems. These risks increase with age. Read the label and take as directed. Unless recommended by your healthcare provider, do not take for more than 10 days.   Your provider may give you an injection of a corticosteroid medicine.   While you are recovering from your injury you will need to change your sport or activity to one that does not make your condition worse. For example, you may need to swim instead of running or bicycling. If you are bicycling, you may need to lower your bicycle seat.   How long do the effects last?   The length of recovery depends on many factors such as your age, health, and if you have had a previous injury. Recovery time also depends on the severity of the injury. A bursa that is only mildly inflamed and has just started to hurt may improve within a few weeks. A bursa that is significantly inflamed and has been painful for a long time  may take up to a few months to improve. You need to stop doing the activities that cause pain until your bursa has healed. If you continue doing activities that cause pain, your symptoms will return and it will take longer to recover.   When can I return to my normal activities?   Everyone recovers from an injury at a different rate. Return to your activities depends on how soon your leg recovers, not by how many days or weeks it has been since your injury has occurred. In general, the longer you have symptoms before you start treatment, the longer it will take to get better. The goal of rehabilitation is to return to your normal activities as soon as is safely possible. If you return too soon you may worsen your injury.   You may safely return to your normal activities when, starting from the top of the list and progressing to the end, each of the following is true:   You have full range of motion in the injured leg compared to the uninjured leg.   You have full strength of the injured leg compared to the uninjured leg.   You can walk straight ahead without pain or limping.   How can I prevent trochanteric bursitis?   Trochanteric bursitis is best prevented by warming up properly and stretching the muscles on the outer side of your upper thigh.     Published by [employer].  This content is reviewed periodically and is subject to change as new health information becomes available. The information is intended to inform and educate and is not a replacement for medical evaluation, advice, diagnosis or treatment by a healthcare professional.   Written by Mike Belle MD, for [employer].   ? 2010 [employer] and/or its affiliates. All Rights Reserved.   Copyright   [employer] 2011         Trochanteric Bursitis Rehabilitation Exercises   You can begin stretching the muscles that run along the outside of your hip using the first two exercise. You can do the strengthening exercises when the sharp pain  lessens.   Stretching exercises     Piriformis stretch: Lying on your back with both knees bent, rest the ankle of your injured leg over the knee of your uninjured leg. Grasp the thigh of your uninjured leg and pull that knee toward your chest. You will feel a stretch along the buttocks and possibly along the outside of your hip on the injured side. Hold this for 15 to 30 seconds. Repeat 3 times.     Iliotibial band stretch (standing): Cross your uninjured leg in front of your injured leg and bend down and touch your toes. You can move your hands across the floor toward the uninjured side and you will feel more stretch on the outside of your thigh on the injured side. Hold this position for 15 to 30 seconds. Return to the starting position. Repeat 3 times.   Strengthening exercises     Straight leg raise: Lie on your back with your legs straight out in front of you. Tighten up the top of your thigh muscle on the injured leg and lift that leg about 8 inches off the floor, keeping the thigh muscle tight throughout. Slowly lower your leg back down to the floor. Do 3 sets of 10.     Wall squat with a ball: Stand with your back, shoulders, and head against a wall and look straight ahead. Keep your shoulders relaxed and your feet 1 foot away from the wall and a shoulder's width apart. Place a rolled up pillow or a soccer-sized ball between your thighs. Keeping your head against the wall, slowly squat while squeezing the pillow or ball at the same time. Squat down until you are almost in a sitting position. Your thighs will not yet be parallel to the floor. Hold this position for 10 seconds and then slowly slide back up the wall. Make sure you keep squeezing the pillow or ball throughout this exercise. Repeat 10 times. Build up to 3 sets of 10.     Prone hip extension: Lie on your stomach with your legs straight out behind you. Tighten up your buttocks muscles and lift one leg off the floor about 8 inches. Keep your knee  straight. Hold for 5 seconds. Then lower your leg and relax. Do 3 sets of 10.   Written by Selena Jacome M.S., P.T., for [employer].   Published by [employer].   This content is reviewed periodically and is subject to change as new health information becomes available. The information is intended to inform and educate and is not a replacement for medical evaluation, advice, diagnosis or treatment by a healthcare professional.   Sports Medicine Advisor 2003.1 Index  Sports Medicine Advisor 2003.1 Credits   Copyright   2003 [employer]. All rights reserved.   Page footer image

## 2017-04-18 NOTE — NURSING NOTE
"Chief Complaint   Patient presents with     Physical       Initial /67  Pulse 63  Ht 5' 9\" (1.753 m)  Wt 191 lb 9.6 oz (86.9 kg)  SpO2 100%  BMI 28.29 kg/m2 Estimated body mass index is 28.29 kg/(m^2) as calculated from the following:    Height as of this encounter: 5' 9\" (1.753 m).    Weight as of this encounter: 191 lb 9.6 oz (86.9 kg).  Medication Reconciliation: complete  Jessica Martini CMA    "

## 2017-04-18 NOTE — PROGRESS NOTES
SUBJECTIVE:     CC: Arslan Clinton is an 52 year old male who presents for preventative health visit.     Healthy Habits:    Do you get at least three servings of calcium containing foods daily (dairy, green leafy vegetables, etc.)? Some days     Amount of exercise or daily activities, outside of work: no    Problems taking medications regularly No    Medication side effects: short of breath - was discussed at last visit     Have you had an eye exam in the past two years? yes    Do you see a dentist twice per year? Tries to see 2 times, currently due     Do you have sleep apnea, excessive snoring or daytime drowsiness?no      Right hip pain x 6-12 months. No injury - pain comes and goes   occ pain with sleeping.   Tx: tylenol.   No history of back pain. No numbness/tingling     Does have cardio apt in 1 wk for short of breath and afib.     Due for colonoscopy , father with history of colon cancer.   Today's PHQ-2 Score:   PHQ-2 ( 1999 Pfizer) 4/12/2017 2/13/2015   Q1: Little interest or pleasure in doing things 0 0   Q2: Feeling down, depressed or hopeless 0 0   PHQ-2 Score 0 0       Abuse: Current or Past(Physical, Sexual or Emotional)- No  Do you feel safe in your environment - Yes    Social History   Substance Use Topics     Smoking status: Never Smoker     Smokeless tobacco: Never Used     Alcohol use Yes      Comment: rare     The patient does not drink >3 drinks per day nor >7 drinks per week.    Last PSA: No results found for: PSA    Recent Labs   Lab Test  03/21/16   0733  09/05/12   0757   CHOL  191  175   HDL  44  57   LDL  117*  99   TRIG  149  93   CHOLHDLRATIO   --   3.1   NHDL  147*   --        Reviewed orders with patient. Reviewed health maintenance and updated orders accordingly - Yes    Reviewed and updated as needed this visit by clinical staff  Tobacco  Allergies  Meds  Problems  Med Hx  Surg Hx  Fam Hx  Soc Hx          Reviewed and updated as needed this visit by Provider  Allergies   Meds  Problems          Past Medical History:   Diagnosis Date     Seizures (H)       Past Surgical History:   Procedure Laterality Date     ARTHROSCOPY KNEE RT/LT       HERNIA REPAIR, INGUINAL RT/LT      Hernia Repair, RT/LT       ROS:  C: NEGATIVE for fever, chills, change in weight  I: NEGATIVE for worrisome rashes, moles or lesions  E: NEGATIVE for vision changes or irritation  ENT: NEGATIVE for ear, mouth and throat problems  R: NEGATIVE for significant cough or SOB  CV: NEGATIVE for chest pain, palpitations or peripheral edema  GI: NEGATIVE for nausea, abdominal pain, heartburn, or change in bowel habits   male: negative for dysuria, hematuria, decreased urinary stream, erectile dysfunction, urethral discharge  M: NEGATIVE for significant arthralgias or myalgia  N: NEGATIVE for weakness, dizziness or paresthesias  P: NEGATIVE for changes in mood or affect    Problem list, Medication list, Allergies, and Medical/Social/Surgical histories reviewed in [facility] and updated as appropriate.  Labs reviewed in EPIC  BP Readings from Last 3 Encounters:   04/18/17 108/67   04/12/17 99/76   03/24/16 118/76    Wt Readings from Last 3 Encounters:   04/18/17 191 lb 9.6 oz (86.9 kg)   04/12/17 193 lb (87.5 kg)   03/24/16 193 lb (87.5 kg)                  Patient Active Problem List   Diagnosis     Other acquired deformity of other parts of limb     CARDIOVASCULAR SCREENING; LDL GOAL LESS THAN 160     Tremor     Weight loss     Varicose veins of lower extremities with complications     Personal history of venous thrombosis and embolism     Right leg pain     Superficial thrombophlebitis     Pulmonary embolism (H)     Thyroid nodule     Thyroid cyst     Family history of colon cancer     Long-term (current) use of anticoagulants [Z79.01]     Other pulmonary embolism without acute cor pulmonale, unspecified chronicity (H)     Atrial flutter, unspecified type (H)     Convulsions, unspecified convulsion type (H)     Past Surgical  "History:   Procedure Laterality Date     ARTHROSCOPY KNEE RT/LT       HERNIA REPAIR, INGUINAL RT/LT      Hernia Repair, RT/LT       Social History   Substance Use Topics     Smoking status: Never Smoker     Smokeless tobacco: Never Used     Alcohol use Yes      Comment: rare     Family History   Problem Relation Age of Onset     HEART DISEASE Mother      Cancer - colorectal Father      late 60's     Thyroid Disease Father      HEART DISEASE Other      CANCER Other      HEART DISEASE Sister          Current Outpatient Prescriptions   Medication Sig Dispense Refill     fluticasone (FLONASE) 50 MCG/ACT spray Spray 1 spray into both nostrils daily       metoprolol (TOPROL-XL) 25 MG 24 hr tablet Take 1 tablet (25 mg) by mouth daily 30 tablet 1     warfarin (COUMADIN) 5 MG tablet Take daily as directed. Current dose 5 mg M,W,F and  7.5 mg Su,Tu,TH,Sa. 135 tablet 0     Acetaminophen (TYLENOL EXTRA STRENGTH PO) Take 2 tablets by mouth every 6 hours as needed       phenytoin (DILANTIN) 100 MG ER capsule Take 5 capsules by mouth daily.       Allergies   Allergen Reactions     Penicillins      OBJECTIVE:     /67  Pulse 63  Ht 5' 9\" (1.753 m)  Wt 191 lb 9.6 oz (86.9 kg)  SpO2 100%  BMI 28.29 kg/m2  EXAM:  GENERAL: healthy, alert and no distress  EYES: Eyes grossly normal to inspection, PERRL and conjunctivae and sclerae normal  HENT: ear canals and TM's normal, nose and mouth without ulcers or lesions  NECK: no adenopathy, no asymmetry, masses, or scars and thyroid normal to palpation  RESP: lungs clear to auscultation - no rales, rhonchi or wheezes  CV: regular rate and rhythm, normal S1 S2, no S3 or S4, no murmur, click or rub, no peripheral edema and peripheral pulses strong  ABDOMEN: soft, nontender, no hepatosplenomegaly, no masses and bowel sounds normal   (male): normal male genitalia without lesions or urethral discharge, no hernia  MS: no gross musculoskeletal defects noted, no edema  SKIN: no suspicious " "lesions or rashes  NEURO: Normal strength and tone, mentation intact and speech normal  PSYCH: mentation appears normal, affect normal/bright  Right hip. full range of motion. Greater trochanter tenderness. Positive nixon's test.   Calves soft non-tender   Neurovascularly Intact Distally.      ASSESSMENT/PLAN:         ICD-10-CM    1. Routine general medical examination at a health care facility Z00.00 Lipid panel reflex to direct LDL   2. Trochanteric bursitis of right hip M70.61 [facility] PT, HAND, AND CHIROPRACTIC REFERRAL   3. Cancer screening Z12.9 GASTROENTEROLOGY ADULT REF PROCEDURE ONLY   4. Family history of colon cancer Z80.0    Work on Healthy diet and exercise. Getting heart rate elevated for 30 mins most days of week.   See Patient Instructions  Briefly discussed cortisone injection of hip pain continues. warning signs discussed. side effects discussed.       COUNSELING:  Reviewed preventive health counseling, as reflected in patient instructions       Regular exercise       Healthy diet/nutrition     reports that he has never smoked. He has never used smokeless tobacco.    Estimated body mass index is 28.29 kg/(m^2) as calculated from the following:    Height as of this encounter: 5' 9\" (1.753 m).    Weight as of this encounter: 191 lb 9.6 oz (86.9 kg).   Weight management plan: Discussed healthy diet and exercise guidelines and patient will follow up in 12 months in clinic to re-evaluate.    Counseling Resources:  ATP IV Guidelines  Pooled Cohorts Equation Calculator  FRAX Risk Assessment  ICSI Preventive Guidelines  Dietary Guidelines for Americans, 2010  USDA's MyPlate  ASA Prophylaxis  Lung CA Screening    Cedric Hodgson PA-C  [facility]  "

## 2017-04-18 NOTE — MR AVS SNAPSHOT
After Visit Summary   4/18/2017    Arslan Clinton    MRN: [id number]           Patient Information     Date Of Birth          1965        Visit Information        Provider Department      4/18/2017 7:00 AM Cedric Hodgson PA-C [facility] [place]        Today's Diagnoses     Routine general medical examination at a health care facility    -  1    Trochanteric bursitis of right hip        Cancer screening          Care Instructions      Preventive Health Recommendations  Male Ages 50 - 64    Yearly exam:             See your health care provider every year in order to  o   Review health changes.   o   Discuss preventive care.    o   Review your medicines if your doctor has prescribed any.     Have a cholesterol test every 5 years, or more frequently if you are at risk for high cholesterol/heart disease.     Have a diabetes test (fasting glucose) every three years. If you are at risk for diabetes, you should have this test more often.     Have a colonoscopy at age 50, or have a yearly FIT test (stool test). These exams will check for colon cancer.      Talk with your health care provider about whether or not a prostate cancer screening test (PSA) is right for you.    You should be tested each year for STDs (sexually transmitted diseases), if you re at risk.     Shots: Get a flu shot each year. Get a tetanus shot every 10 years.     Nutrition:    Eat at least 5 servings of fruits and vegetables daily.     Eat whole-grain bread, whole-wheat pasta and brown rice instead of white grains and rice.     Talk to your provider about Calcium and Vitamin D.     Lifestyle    Exercise for at least 150 minutes a week (30 minutes a day, 5 days a week). This will help you control your weight and prevent disease.     Limit alcohol to one drink per day.     No smoking.     Wear sunscreen to prevent skin cancer.     See your dentist every six months for an exam and cleaning.     See your eye doctor every  1 to 2 years.                   Trochanteric Bursitis           What is trochanteric bursitis?   Trochanteric bursitis is irritation or inflammation of the trochanteric bursa. A bursa is a fluid-filled sac that acts as a cushion between tendons, bones, and skin. The trochanteric bursa is located on the upper, outer area of the thigh. There is a bump on the outer side of the upper part of the thigh bone (femur) called the greater trochanter. The trochanteric bursa is located over the greater trochanter.   How does it occur?   The trochanteric bursa may be inflamed by a group of muscles or tendons rubbing over the bursa and causing friction against the thigh bone. This injury can occur with running, walking, or bicycling, especially when the bicycle seat is too high.   What are the symptoms?   You have pain on the upper outer area of your thigh or in your hip. The pain is worse when you walk, bicycle, or go up or down stairs. You have pain when you move your thigh bone and feel tenderness in the area over the greater trochanter.   How is it diagnosed?   Your healthcare provider will ask about your symptoms and examine your hip and thigh.   How is it treated?   To treat this condition:   Put an ice pack, gel pack, or package of frozen vegetables, wrapped in a cloth on the area every 3 to 4 hours, for up to 20 minutes at a time.   Take an anti-inflammatory medicine such as ibuprofen, or other medicine as directed by your provider. Nonsteroidal anti-inflammatory medicines (NSAIDs) may cause stomach bleeding and other problems. These risks increase with age. Read the label and take as directed. Unless recommended by your healthcare provider, do not take for more than 10 days.   Your provider may give you an injection of a corticosteroid medicine.   While you are recovering from your injury you will need to change your sport or activity to one that does not make your condition worse. For example, you may need to swim  instead of running or bicycling. If you are bicycling, you may need to lower your bicycle seat.   How long do the effects last?   The length of recovery depends on many factors such as your age, health, and if you have had a previous injury. Recovery time also depends on the severity of the injury. A bursa that is only mildly inflamed and has just started to hurt may improve within a few weeks. A bursa that is significantly inflamed and has been painful for a long time may take up to a few months to improve. You need to stop doing the activities that cause pain until your bursa has healed. If you continue doing activities that cause pain, your symptoms will return and it will take longer to recover.   When can I return to my normal activities?   Everyone recovers from an injury at a different rate. Return to your activities depends on how soon your leg recovers, not by how many days or weeks it has been since your injury has occurred. In general, the longer you have symptoms before you start treatment, the longer it will take to get better. The goal of rehabilitation is to return to your normal activities as soon as is safely possible. If you return too soon you may worsen your injury.   You may safely return to your normal activities when, starting from the top of the list and progressing to the end, each of the following is true:   You have full range of motion in the injured leg compared to the uninjured leg.   You have full strength of the injured leg compared to the uninjured leg.   You can walk straight ahead without pain or limping.   How can I prevent trochanteric bursitis?   Trochanteric bursitis is best prevented by warming up properly and stretching the muscles on the outer side of your upper thigh.     Published by [employer].  This content is reviewed periodically and is subject to change as new health information becomes available. The information is intended to inform and educate and is not a  replacement for medical evaluation, advice, diagnosis or treatment by a healthcare professional.   Written by Mike Belle MD, for [employer].   ? 2010 [employer] ([employer])[facility] and/or its affiliates. All Rights Reserved.   Copyright   [employer] Reference Systems 2011         Trochanteric Bursitis Rehabilitation Exercises   You can begin stretching the muscles that run along the outside of your hip using the first two exercise. You can do the strengthening exercises when the sharp pain lessens.   Stretching exercises     Piriformis stretch: Lying on your back with both knees bent, rest the ankle of your injured leg over the knee of your uninjured leg. Grasp the thigh of your uninjured leg and pull that knee toward your chest. You will feel a stretch along the buttocks and possibly along the outside of your hip on the injured side. Hold this for 15 to 30 seconds. Repeat 3 times.     Iliotibial band stretch (standing): Cross your uninjured leg in front of your injured leg and bend down and touch your toes. You can move your hands across the floor toward the uninjured side and you will feel more stretch on the outside of your thigh on the injured side. Hold this position for 15 to 30 seconds. Return to the starting position. Repeat 3 times.   Strengthening exercises     Straight leg raise: Lie on your back with your legs straight out in front of you. Tighten up the top of your thigh muscle on the injured leg and lift that leg about 8 inches off the floor, keeping the thigh muscle tight throughout. Slowly lower your leg back down to the floor. Do 3 sets of 10.     Wall squat with a ball: Stand with your back, shoulders, and head against a wall and look straight ahead. Keep your shoulders relaxed and your feet 1 foot away from the wall and a shoulder's width apart. Place a rolled up pillow or a soccer-sized ball between your thighs. Keeping your head against the wall, slowly squat while squeezing the pillow or ball at the same  time. Squat down until you are almost in a sitting position. Your thighs will not yet be parallel to the floor. Hold this position for 10 seconds and then slowly slide back up the wall. Make sure you keep squeezing the pillow or ball throughout this exercise. Repeat 10 times. Build up to 3 sets of 10.     Prone hip extension: Lie on your stomach with your legs straight out behind you. Tighten up your buttocks muscles and lift one leg off the floor about 8 inches. Keep your knee straight. Hold for 5 seconds. Then lower your leg and relax. Do 3 sets of 10.   Written by Selena Jacome M.S., P.T., for [employer].   Published by [employer].   This content is reviewed periodically and is subject to change as new health information becomes available. The information is intended to inform and educate and is not a replacement for medical evaluation, advice, diagnosis or treatment by a healthcare professional.   Sports Medicine Advisor 2003.1 Index  Sports Medicine Advisor 2003.1 Credits   Copyright   2003 [employer]. All rights reserved.   Page footer image                      Follow-ups after your visit        Additional Services     GASTROENTEROLOGY ADULT REF PROCEDURE ONLY       Last Lab Result: Creatinine (mg/dL)       Date                     Value                 04/12/2017               1.07             ----------  Body mass index is 28.29 kg/(m^2).      Patient will be contacted to schedule procedure.     Please be aware that coverage of these services is subject to the terms and limitations of your health insurance plan.  Call member services at your health plan with any benefit or coverage questions.  Any procedures must be performed at a [facility] facility OR coordinated by your clinic's referral office.    Please bring the following with you to your appointment:    (1) Any X-Rays, CTs or MRIs which have been performed.  Contact the facility where they were  done to arrange for  prior to your scheduled appointment.    (2) List of current medications   (3) This referral request   (4) Any documents/labs given to you for this referral            [facility] PT, HAND, AND CHIROPRACTIC REFERRAL       **This order will print in the [facility] Scheduling Office**    Physical Therapy, Hand Therapy and Chiropractic Care are available through:    *[facility]  *[facility]  *[facility]    Call one number to schedule at any of the above locations: [phone].    Your provider has referred you to: Physical Therapy at [facility] or [facility] [place] [facility]    Indication/Reason for Referral: hip  Onset of Illness: 6 months  Therapy Orders: Evaluate and Treat  Special Programs: None  Special Request: None    Dimple Musa      Additional Comments for the Therapist or Chiropractor:     Please be aware that coverage of these services is subject to the terms and limitations of your health insurance plan.  Call member services at your health plan with any benefit or coverage questions.      Please bring the following to your appointment:    *Your personal calendar for scheduling future appointments  *Comfortable clothing                  Your next 10 appointments already scheduled     Apr 26, 2017 10:15 AM CDT   New Visit with Rafal Cotton MD   [facility]    [address] [place] [address]   [phone]            Apr 26, 2017 11:00 AM CDT   New Visit with KOKO Gastelum MD   [facility] ([facility])    [address] [place] [address] 2nd Floor  [facility] [address]   [phone]            May 05, 2017  7:15 AM CDT   Anticoagulation Visit with AN ANTI COAG   [facility]    [address] [facility] [address] [phone]              Who to contact     If you have questions or need follow up  "information about today's clinic visit or your schedule please contact [facility] directly at [phone].  Normal or non-critical lab and imaging results will be communicated to you by MyChart, letter or phone within 4 business days after the clinic has received the results. If you do not hear from us within 7 days, please contact the clinic through MyChart or phone. If you have a critical or abnormal lab result, we will notify you by phone as soon as possible.  Submit refill requests through [employer] or call your pharmacy and they will forward the refill request to us. Please allow 3 business days for your refill to be completed.          Additional Information About Your Visit        MyChart Information     [employer] lets you send messages to your doctor, view your test results, renew your prescriptions, schedule appointments and more. To sign up, go to [email or web address] . Click on \"Log in\" on the left side of the screen, which will take you to the Welcome page. Then click on \"Sign up Now\" on the right side of the page.     You will be asked to enter the access code listed below, as well as some personal information. Please follow the directions to create your username and password.     Your access code is: [email or web address]  Expires: 2017 11:45 AM     Your access code will  in 90 days. If you need help or a new code, please call your [facility] clinic or [phone].        Care EveryWhere ID     This is your Care EveryWhere ID. This could be used by other organizations to access your [facility] medical records  [id number]        Your Vitals Were     Pulse Height Pulse Oximetry BMI (Body Mass Index)          63 5' 9\" (1.753 m) 100% 28.29 kg/m2         Blood Pressure from Last 3 Encounters:   17 108/67   17 99/76   16 118/76    Weight from Last 3 Encounters:   17 191 lb 9.6 oz (86.9 kg)   17 193 lb (87.5 kg)   16 193 lb (87.5 kg)              We Performed " the Following     GASTROENTEROLOGY ADULT REF PROCEDURE ONLY     [facility] PT, HAND, AND CHIROPRACTIC REFERRAL        Primary Care Provider Office Phone # Fax #    Cedric Hodgson PA-C [phone]       [facility] [address] [address] [facility] [address]        Thank you!     Thank you for choosing [facility]  for your care. Our goal is always to provide you with excellent care. Hearing back from our patients is one way we can continue to improve our services. Please take a few minutes to complete the written survey that you may receive in the mail after your visit with us. Thank you!             Your Updated Medication List - Protect others around you: Learn how to safely use, store and throw away your medicines at www.disposemymeds.org.          This list is accurate as of: 4/18/17  7:31 AM.  Always use your most recent med list.                   Brand Name Dispense Instructions for use    DILANTIN 100 MG CR capsule   Generic drug:  phenytoin      Take 5 capsules by mouth daily.       fluticasone 50 MCG/ACT spray    FLONASE     Spray 1 spray into both nostrils daily       metoprolol 25 MG 24 hr tablet    TOPROL-XL    30 tablet    Take 1 tablet (25 mg) by mouth daily       TYLENOL EXTRA STRENGTH PO      Take 2 tablets by mouth every 6 hours as needed       warfarin 5 MG tablet    COUMADIN    135 tablet    Take daily as directed. Current dose 5 mg M,W,F and  7.5 mg Su,Tu,TH,Sa.

## 2017-04-21 ENCOUNTER — PRE VISIT (OUTPATIENT)
Dept: CARDIOLOGY | Facility: CLINIC | Age: 52
End: 2017-04-21

## 2017-04-21 NOTE — TELEPHONE ENCOUNTER
HPI:  Arslan Clinton is a 52 year old male who presents to clinic today for the following health issues:     Shortness of breath       Duration: over the last year - happening more often recently     Description (location/character/radiation): gets short of breath    Intensity: Variable     Accompanying signs and symptoms: light headed and feels like he is going to pass out at times    History (similar episodes/previous evaluation): pt has history of blood clot - worried this is happening again. Had pain in his right thigh yesterday - was sitting a lot     Precipitating or alleviating factors: None    Therapies tried and outcome: has to take deep breaths in    History of PE and is on coumadin. History of atrial flutter at time of diagnosis. States he was cardioverted into normal rhythm. No symptoms since.  Gradual onset of short of breath and dizziness. 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 Or sometime when moving boxes and exerting himself. No chest pains.      History of seizures, see's neurology.  History of thyroid nodule with US 2015. He didn't recall having that done.   Was to follow up with ENT which he didn't .  Care Everywhere Reviewed    ASSESSMENT & PLAN:  1. SOB (shortness of breath) R06.02 CBC with platelets differential       Comprehensive metabolic panel       US Thyroid       EKG 12-lead complete w/read - Clinics       CARDIOLOGY EVAL ADULT REFERRAL   2. Dizziness R42 CBC with platelets differential       Comprehensive metabolic panel       US Thyroid       CARDIOLOGY EVAL ADULT REFERRAL   3. Thyroid nodule E04.1 TSH with free T4 reflex       OTOLARYNGOLOGY REFERRAL   4. Personal history of venous thrombosis and embolism Z86.718     5. Long-term (current) use of anticoagulants [Z79.01] Z79.01     6. Atrial flutter, unspecified type (H) I48.92 CARDIOLOGY EVAL ADULT REFERRAL       metoprolol (TOPROL-XL) 25 MG 24 hr tablet   7. Convulsions, unspecified  convulsion type (H) R56.9     start metoprolol 25 mg daily.   warning signs discussed. side effects discussed  If symptoms increase go to ED.  Follow up With cardiology 1 wk.   Get US updated on thyroid, labs pending. Follow up With ENT.  Discussed with Dr. Fontaine     Last EC2017  Atrial flutter-fibrillation   -Right bundle branch block with left axis -bifascicular block.    -  Negative T-waves  -Possible  Inferior  ischemia.     ABNORMAL

## 2017-04-26 ENCOUNTER — HOSPITAL ENCOUNTER (OUTPATIENT)
Facility: CLINIC | Age: 52
End: 2017-04-26
Attending: INTERNAL MEDICINE | Admitting: INTERNAL MEDICINE
Payer: COMMERCIAL

## 2017-04-26 ENCOUNTER — OFFICE VISIT (OUTPATIENT)
Dept: CARDIOLOGY | Facility: CLINIC | Age: 52
End: 2017-04-26
Payer: COMMERCIAL

## 2017-04-26 ENCOUNTER — OFFICE VISIT (OUTPATIENT)
Dept: OTOLARYNGOLOGY | Facility: CLINIC | Age: 52
End: 2017-04-26
Payer: COMMERCIAL

## 2017-04-26 VITALS
OXYGEN SATURATION: 95 % | DIASTOLIC BLOOD PRESSURE: 87 MMHG | HEART RATE: 121 BPM | BODY MASS INDEX: 28.89 KG/M2 | WEIGHT: 195.6 LBS | SYSTOLIC BLOOD PRESSURE: 113 MMHG

## 2017-04-26 VITALS — WEIGHT: 195.6 LBS | HEIGHT: 69 IN | RESPIRATION RATE: 16 BRPM | BODY MASS INDEX: 28.97 KG/M2

## 2017-04-26 DIAGNOSIS — E04.1 THYROID NODULE: Primary | ICD-10-CM

## 2017-04-26 DIAGNOSIS — I48.92 ATRIAL FLUTTER BY ELECTROCARDIOGRAM (H): Primary | ICD-10-CM

## 2017-04-26 PROCEDURE — 99205 OFFICE O/P NEW HI 60 MIN: CPT | Performed by: INTERNAL MEDICINE

## 2017-04-26 PROCEDURE — 99243 OFF/OP CNSLTJ NEW/EST LOW 30: CPT | Performed by: OTOLARYNGOLOGY

## 2017-04-26 ASSESSMENT — PAIN SCALES - GENERAL
PAINLEVEL: NO PAIN (0)
PAINLEVEL: NO PAIN (0)

## 2017-04-26 NOTE — LETTER
4/26/2017      RE: Arslan Clinton  [address] [address] [facility] [address]       Dear Colleague,    Thank you for the opportunity to participate in the care of your patient, Arslan Clinton, at the [facility] HEART AT [facility] at [facility]. Please see a copy of my visit note below.       SUBJECTIVE:  Arslan Clinton is a 52 year old male who presents for evaluation of atrial flutter,dizziness,SOB.   In 2/2015 had massive B/L PE and cardiac arrest in hospital([facility]Since then on coumadin.  For 1 yr patient has NYE/dizziness on exertion..Previously healthy and active.  On Dilantin for Convulsion.  Patient do not feel and palpitation. Recent EKG shows flutter with rate around 120 BPM> Duration of flutter unclear.  Non smoker. No excess alcohol,coffee.No DM/HTN/HLD.    Patient Active Problem List    Diagnosis Date Noted     Atrial flutter, unspecified type (H) 04/12/2017     Priority: Medium     Convulsions, unspecified convulsion type (H) 04/12/2017     Priority: Medium     Long-term (current) use of anticoagulants [Z79.01] 04/04/2016     Priority: Medium     Other pulmonary embolism without acute cor pulmonale, unspecified chronicity (H) 04/04/2016     Priority: Medium     Family history of colon cancer 03/24/2016     Priority: Medium     Pulmonary embolism (H) 02/13/2015     Priority: Medium     Thyroid nodule 02/13/2015     Priority: Medium     Thyroid cyst 02/13/2015     Priority: Medium     Varicose veins of lower extremities with complications 12/18/2013     Priority: Medium     Problem list name updated by automated process. Provider to review       Personal history of venous thrombosis and embolism 12/18/2013     Priority: Medium     Right leg pain 12/18/2013     Priority: Medium     Superficial thrombophlebitis 12/18/2013     Priority: Medium     CARDIOVASCULAR SCREENING; LDL GOAL LESS THAN 160 09/05/2012     Priority: Medium      Tremor 09/05/2012     Priority: Medium     Weight loss 09/05/2012     Priority: Medium     Other acquired deformity of other parts of limb 11/14/2006     Priority: Medium    .  Current Outpatient Prescriptions   Medication Sig     metoprolol (TOPROL-XL) 25 MG 24 hr tablet Take 1 tablet (25 mg) by mouth daily     warfarin (COUMADIN) 5 MG tablet Take daily as directed. Current dose 5 mg M,W,F and  7.5 mg Jenkins,Tu,TH,Sa.     phenytoin (DILANTIN) 100 MG ER capsule Take 5 capsules by mouth daily.     fluticasone (FLONASE) 50 MCG/ACT spray Spray 1 spray into both nostrils daily Reported on 4/26/2017     Acetaminophen (TYLENOL EXTRA STRENGTH PO) Take 2 tablets by mouth every 6 hours as needed Reported on 4/26/2017     No current facility-administered medications for this visit.      Past Medical History:   Diagnosis Date     Seizures (H)      Past Surgical History:   Procedure Laterality Date     ARTHROSCOPY KNEE RT/LT       HERNIA REPAIR, INGUINAL RT/LT      Hernia Repair, RT/LT     Allergies   Allergen Reactions     Penicillins      Social History     Social History     Marital status: Single     Spouse name: N/A     Number of children: N/A     Years of education: N/A     Occupational History     Not on file.     Social History Main Topics     Smoking status: Never Smoker     Smokeless tobacco: Never Used     Alcohol use Yes      Comment: rare     Drug use: No     Sexual activity: Yes     Other Topics Concern     Not on file     Social History Narrative     Family History   Problem Relation Age of Onset     HEART DISEASE Mother      Cancer - colorectal Father      late 60's     Thyroid Disease Father      HEART DISEASE Other      CANCER Other      HEART DISEASE Sister           REVIEW OF SYSTEMS:  General: negative, fever, chills, night sweats  Skin: negative, acne, rash and scaling  Eyes: negative, double vision, eye pain and photophobia  Ears/Nose/Throat: negative, nasal congestion and purulent rhinorrhea  Respiratory: No  cough, No hemoptysis and negative  Cardiovascular: negative, palpitations, tachycardia, irregular heart beat, chest pain, exertional chest pain or pressure, paroxysmal nocturnal dyspnea and orthopnea  Gastrointestinal: negative, dysphagia, nausea and vomiting  Genitourinary: negative, nocturia, dysuria and frequency  Musculoskeletal: negative, fracture, back pain and neck pain  Neurologic: negative, headaches, syncope, stroke and paralysis  Psychiatric: negative, nervous breakdown, thoughts of self-harm and thoughts of hurting someone else  Hematologic/Lymphatic/Immunologic: negative, bleeding disorder, chills and fever  Endocrine: negative, cold intolerance, heat intolerance and hot flashes       OBJECTIVE:  Blood pressure 113/87, pulse 121, weight 88.7 kg (195 lb 9.6 oz), SpO2 95 %.  General Appearance: healthy, alert, active and no distress  Head: Normocephalic. No masses, lesions, tenderness or abnormalities  Eyes: conjuctiva clear, PERRL, EOM intact  Ears: External ears normal. Canals clear. TM's normal.  Nose: Nares normal  Mouth: normal  Neck: Supple, no cervical adenopathy, no thyromegaly  Lungs: clear to auscultation  Cardiac: regular rate and rhythm, normal S1 and S2, no murmur  Abdomen: Soft, nontender.  Normal bowel sounds.  No hepatosplenomegaly or abnormal masses  Extremities: no peripheral edema, peripheral pulses normal  Musculoskeletal: negative  Neurological: Cranial nerves 2-12 intact, motor strength intact       ASSESSMENT/PLAN:  52 yr old previously healthy male presents 1yr H/O exertional dizziness/SOB.  No significant CAD risk factors.  Massive PE in 2/2015,on coumadin since then.  Recent EKG reviewed. Aflutter. Rate ~120 BPM.  [facility] reviewed.  EKG-NSR. 2015.  Echo-normal LV/RV size and function 2015.  Need to assess LV function and plan for cardioversion.  As he had occasional dips in his INR and is being checked every 5 weeks, will plan for a MEGHA,cardioversion as we can also  assess LV function grossly.  Explained procedure and will schedule ASAP.  Per orders.   Return to Clinic 3 months .    Please do not hesitate to contact me if you have any questions/concerns.     Sincerely,     KOKO Gastelum MD

## 2017-04-26 NOTE — PATIENT INSTRUCTIONS
General Scheduling Information    ENT Clinic Locations Clinic Hours Telephone Number     [facility]  [address]  [place] [address]   Tuesday:       8:00am -- 4:00pm    Wednesday:  8:00am - 4:00pm   To schedule an appointment with   Dr. Cotton,   please contact our   Specialty Scheduling Department at:     [phone]       [facility]  [address] [place] [address]   Friday:          8:00am - 4:00pm         Urgent Care Locations Clinic Hours Telephone Numbers     [facility]  [address] [place] [address]     Monday-Friday:     11:00pm - 9:00pm    Saturday-Sunday:  9:00am - 5:00pm   [phone]     [facility]  [address] [place] [address]     Monday-Friday:      5:00pm - 9:00pm     Saturday-Sunday:  9:00am - 5:00pm   [phone]     [facility]  [address] [place] [address]    Appointments  Primary and Specialty Care:  [phone]      Patient Name: Arslan Clinton  YOB: 1965                        Home Phone: [phone] (home)     Test or Treatment: FNA (Fine Needle Aspiration)  Department Scheduled with: Pathology  Symptoms/Diagnosis: The encounter diagnosis was Thyroid nodule.    Referring: Dr. Cotton  Clinic or Hospital : [facility]    Phone: [phone]

## 2017-04-26 NOTE — MR AVS SNAPSHOT
After Visit Summary   4/26/2017    Arslan Clinton    MRN: [id number]           Patient Information     Date Of Birth          1965        Visit Information        Provider Department      4/26/2017 11:00 AM KOKO Gastelum MD [facility] PHYSICIANS HEART AT [facility]        Today's Diagnoses     Atrial flutter (H)    -  1      Care Instructions    1.  You are to be scheduled for a Transesophageal Echocardiogram followed by a Cardioversion at the [facility] ([address], [place], [phone]). Please call our clinic at [phone] as soon as possible to get this scheduled either for Thursday or Friday this week.    Below are instructions, if you have questions please contact our office.    1. You should report to the [facility]     2. Report to the [facility] waiting room.     3. DO NOT EAT OR DRINK ANYTHING FOR 6 HOURS PRIOR TO ARRIVAL.     4. The morning of your procedure you may take any scheduled medications with a SIP of water unless otherwise instructed.     5. You will receive medication that makes you sleepy, so you need to have someone drive you home and stay with you for 24 hours following this procedure.  You should not make any legal decisions for 24 hours following discharge.       [facility] Cardiology - [place] Location    If you have any questions regarding to your visit please contact your care team:     Cardiology  Telephone Number   Megan Leach  Cardiology RN's.    Marge Paula CMA [phone]    After hours: [phone].  [phone]   For scheduling appts:     [phone] or  [phone]    After hours: [phone]   For the Device Clinic (Pacemakers and ICD's)  RN's :  Faviola Gray   During business hours: [phone]  After business hours:  [phone]- select option 4.      If you need a medication refill please contact your pharmacy.  Please allow 3 business days for  "your refill to be completed.    As always, Thank you for trusting us with your health care needs!  _____________________________________________________________________            Follow-ups after your visit        Your next 10 appointments already scheduled     May 05, 2017  7:15 AM CDT   Anticoagulation Visit with AN ANTI COAG   [facility]    [address] [facility] [address] [phone]              Future tests that were ordered for you today     Open Future Orders        Priority Expected Expires Ordered    Transesophageal Echocardiogram Routine 4/27/2017 4/26/2018 4/26/2017    Cardioversion Routine 4/27/2017 4/26/2018 4/26/2017    US Biopsy Thyroid Fine Needle Aspiration Routine  4/26/2018 4/26/2017    US Head Neck Soft Tissue Routine 7/25/2017 4/26/2018 4/26/2017            Who to contact     If you have questions or need follow up information about today's clinic visit or your schedule please contact [facility] PHYSICIANS HEART AT [facility] directly at [phone].  Normal or non-critical lab and imaging results will be communicated to you by MyChart, letter or phone within 4 business days after the clinic has received the results. If you do not hear from us within 7 days, please contact the clinic through MyChart or phone. If you have a critical or abnormal lab result, we will notify you by phone as soon as possible.  Submit refill requests through [facility] or call your pharmacy and they will forward the refill request to us. Please allow 3 business days for your refill to be completed.          Additional Information About Your Visit        [employer] Information     [facility] lets you send messages to your doctor, view your test results, renew your prescriptions, schedule appointments and more. To sign up, go to [email or web address] . Click on \"Log in\" on the left side of the screen, which will take you to the Welcome page. Then click " "on \"Sign up Now\" on the right side of the page.     You will be asked to enter the access code listed below, as well as some personal information. Please follow the directions to create your username and password.     Your access code is: [email or web address]  Expires: 2017 11:45 AM     Your access code will  in 90 days. If you need help or a new code, please call your [facility] clinic or [phone].        Care EveryWhere ID     This is your Care EveryWhere ID. This could be used by other organizations to access your [facility] medical records  [id number]        Your Vitals Were     Pulse Pulse Oximetry BMI (Body Mass Index)             121 95% 28.89 kg/m2          Blood Pressure from Last 3 Encounters:   17 113/87   17 108/67   17 99/76    Weight from Last 3 Encounters:   17 88.7 kg (195 lb 9.6 oz)   17 88.7 kg (195 lb 9.6 oz)   17 86.9 kg (191 lb 9.6 oz)               Primary Care Provider Office Phone # Fax #    Cedric Hodgson PA-C [phone]       [facility] [address] [place] [address]        Thank you!     Thank you for choosing [facility] PHYSICIANS HEART AT [facility]  for your care. Our goal is always to provide you with excellent care. Hearing back from our patients is one way we can continue to improve our services. Please take a few minutes to complete the written survey that you may receive in the mail after your visit with us. Thank you!             Your Updated Medication List - Protect others around you: Learn how to safely use, store and throw away your medicines at www.disposemymeds.org.          This list is accurate as of: 17 11:46 AM.  Always use your most recent med list.                   Brand Name Dispense Instructions for use    DILANTIN 100 MG CR capsule   Generic drug:  phenytoin      Take 5 capsules by mouth daily.       fluticasone 50 MCG/ACT spray    FLONASE     Spray 1 spray into " both nostrils daily Reported on 4/26/2017       metoprolol 25 MG 24 hr tablet    TOPROL-XL    30 tablet    Take 1 tablet (25 mg) by mouth daily       TYLENOL EXTRA STRENGTH PO      Take 2 tablets by mouth every 6 hours as needed Reported on 4/26/2017       warfarin 5 MG tablet    COUMADIN    135 tablet    Take daily as directed. Current dose 5 mg M,W,F and  7.5 mg Jenkins,Tu,TH,Sa.

## 2017-04-26 NOTE — PROGRESS NOTES
I am seeing this patient in consultation for a thyroid nodule at the request of the provider Cedric Hodgson.    Chief Complaint - thyroid nodule    History of Present Illness - Arslan Clinton is a 52 year old male who was found to have a thyroid mass on evaluation. The patient was unaware of any thyroid or neck mass. They deny symptoms of hoarseness, dysphagia, odynaphagia, neck or throat pain, or hemoptysis. An ultrasound was performed 2/2015 that showed a 2.5x2.0x2.7 cm complex cystic nodule. He doesn't recall a biopsy. No pain. No hoarseness. He can feel this neck lump more lately, and so it maybe enlarged. No dysphagia. His father had his thyroid removed, but he is unsure if this was a cancer. TSH was 1.24. He is having some heart palpations. He has gained some weight. He is on coumadin     Past Medical History -   Patient Active Problem List   Diagnosis     Other acquired deformity of other parts of limb     CARDIOVASCULAR SCREENING; LDL GOAL LESS THAN 160     Tremor     Weight loss     Varicose veins of lower extremities with complications     Personal history of venous thrombosis and embolism     Right leg pain     Superficial thrombophlebitis     Pulmonary embolism (H)     Thyroid nodule     Thyroid cyst     Family history of colon cancer     Long-term (current) use of anticoagulants [Z79.01]     Other pulmonary embolism without acute cor pulmonale, unspecified chronicity (H)     Atrial flutter, unspecified type (H)     Convulsions, unspecified convulsion type (H)       Current Medications -   Current Outpatient Prescriptions:      fluticasone (FLONASE) 50 MCG/ACT spray, Spray 1 spray into both nostrils daily, Disp: , Rfl:      metoprolol (TOPROL-XL) 25 MG 24 hr tablet, Take 1 tablet (25 mg) by mouth daily, Disp: 30 tablet, Rfl: 1     warfarin (COUMADIN) 5 MG tablet, Take daily as directed. Current dose 5 mg M,W,F and  7.5 mg Su,Tu,TH,Sa., Disp: 135 tablet, Rfl: 0     Acetaminophen (TYLENOL EXTRA STRENGTH  "PO), Take 2 tablets by mouth every 6 hours as needed, Disp: , Rfl:      phenytoin (DILANTIN) 100 MG ER capsule, Take 5 capsules by mouth daily., Disp: , Rfl:     Allergies -   Allergies   Allergen Reactions     Penicillins        Social History -   Social History     Social History     Marital status: Single     Spouse name: N/A     Number of children: N/A     Years of education: N/A     Social History Main Topics     Smoking status: Never Smoker     Smokeless tobacco: Never Used     Alcohol use Yes      Comment: rare     Drug use: No     Sexual activity: Yes     Other Topics Concern     None     Social History Narrative       Family History -   Family History   Problem Relation Age of Onset     HEART DISEASE Mother      Cancer - colorectal Father      late 60's     Thyroid Disease Father      HEART DISEASE Other      CANCER Other      HEART DISEASE Sister        Review of Systems - As per HPI and PMHx, otherwise 7 system review of the head and neck negative.    Physical Exam  Resp 16  Ht 1.753 m (5' 9\")  Wt 88.7 kg (195 lb 9.6 oz)  BMI 28.89 kg/m2  General - The patient is in no distress. Alert and oriented x3, answers questions and cooperates with examination appropriately.   Voice and Breathing - The patient was breathing comfortably without the use of accessory muscles. There was no wheezing, stridor, or stertor.  The patients voice was clear and strong.  Head and Face - Normocephalic and atraumatic.    Eyes - Extraocular movements intact. Sclera were not icteric or injected, conjunctiva were pink and moist.  Neurologic - Cranial nerves II-XII are grossly intact. Specifically, the facial nerve is intact, House-Brackmann grade 1 of 6.   Mouth - Examination of the oral cavity showed pink, healthy oral mucosa. No lesions or ulcerations noted. The tongue was mobile and protrudes midline.  Oropharynx - The walls of the oropharynx were smooth, symmetric, and had no lesions or ulcerations. The uvula was midline and " the palate raised symmetrically.   Neck -  Palpation of the occipital, submental, submandibular, internal jugular chain, and supraclavicular nodes did not demonstrate any abnormal lymph nodes or masses. The parotid glands were without masses. Palpation of the thyroid revealed a 3 cm mass left lobe, mobile. There was no significant pain with palpation. The trachea was midline.      A/P - Arslan Clinton is a 52 year old male with a left thyroid nodule. I have explained the importance of a ultrasound-guided FNA of the nodule to rule-out malignancy. I'll also get an U/s of the neck to look for any additional lymphadenopathy. I will call the patient with the results of the FNA and next steps, which may require thyroidectomy if malignant, observation, or possibly repeat biopsy if the first FNA is inconclusive.       Rafal Cotton MD  Otolaryngology  [facility]

## 2017-04-26 NOTE — PATIENT INSTRUCTIONS
1.  You are to be scheduled for a Transesophageal Echocardiogram followed by a Cardioversion at the [facility] ([address], [place], [phone]). Please call our clinic at [phone] as soon as possible to get this scheduled either for Thursday or Friday this week.    Below are instructions, if you have questions please contact our office.    1. You should report to the [facility]     2. Report to the [facility] waiting room.     3. DO NOT EAT OR DRINK ANYTHING FOR 6 HOURS PRIOR TO ARRIVAL.     4. The morning of your procedure you may take any scheduled medications with a SIP of water unless otherwise instructed.     5. You will receive medication that makes you sleepy, so you need to have someone drive you home and stay with you for 24 hours following this procedure.  You should not make any legal decisions for 24 hours following discharge.       [facility] Cardiology - [place] Location    If you have any questions regarding to your visit please contact your care team:     Cardiology  Telephone Number   Megan Leach  Cardiology RN's.    Marge Paula CMA [phone]    After hours: [phone].  [phone]   For scheduling appts:     [phone] or  [phone]    After hours: [phone]   For the Device Clinic (Pacemakers and ICD's)  RN's :  Faviola Gray   During business hours: [phone]  After business hours:  [phone]- select option 4.      If you need a medication refill please contact your pharmacy.  Please allow 3 business days for your refill to be completed.    As always, Thank you for trusting us with your health care needs!  _____________________________________________________________________

## 2017-04-26 NOTE — PROGRESS NOTES
SUBJECTIVE:  Arslan Clinton is a 52 year old male who presents for evaluation of atrial flutter,dizziness,SOB.   In 2/2015 had massive B/L PE and cardiac arrest in hospital([facility]).Since then on coumadin.  For 1 yr patient has NYE/dizziness on exertion..Previously healthy and active.  On Dilantin for Convulsion.  Patient do not feel and palpitation. Recent EKG shows flutter with rate around 120 BPM> Duration of flutter unclear.  Non smoker. No excess alcohol,coffee.No DM/HTN/HLD.    Patient Active Problem List    Diagnosis Date Noted     Atrial flutter, unspecified type (H) 04/12/2017     Priority: Medium     Convulsions, unspecified convulsion type (H) 04/12/2017     Priority: Medium     Long-term (current) use of anticoagulants [Z79.01] 04/04/2016     Priority: Medium     Other pulmonary embolism without acute cor pulmonale, unspecified chronicity (H) 04/04/2016     Priority: Medium     Family history of colon cancer 03/24/2016     Priority: Medium     Pulmonary embolism (H) 02/13/2015     Priority: Medium     Thyroid nodule 02/13/2015     Priority: Medium     Thyroid cyst 02/13/2015     Priority: Medium     Varicose veins of lower extremities with complications 12/18/2013     Priority: Medium     Problem list name updated by automated process. Provider to review       Personal history of venous thrombosis and embolism 12/18/2013     Priority: Medium     Right leg pain 12/18/2013     Priority: Medium     Superficial thrombophlebitis 12/18/2013     Priority: Medium     CARDIOVASCULAR SCREENING; LDL GOAL LESS THAN 160 09/05/2012     Priority: Medium     Tremor 09/05/2012     Priority: Medium     Weight loss 09/05/2012     Priority: Medium     Other acquired deformity of other parts of limb 11/14/2006     Priority: Medium    .  Current Outpatient Prescriptions   Medication Sig     metoprolol (TOPROL-XL) 25 MG 24 hr tablet Take 1 tablet (25 mg) by mouth daily     warfarin (COUMADIN) 5 MG tablet Take daily  as directed. Current dose 5 mg M,W,F and  7.5 mg Jenkins,Tu,TH,Sa.     phenytoin (DILANTIN) 100 MG ER capsule Take 5 capsules by mouth daily.     fluticasone (FLONASE) 50 MCG/ACT spray Spray 1 spray into both nostrils daily Reported on 4/26/2017     Acetaminophen (TYLENOL EXTRA STRENGTH PO) Take 2 tablets by mouth every 6 hours as needed Reported on 4/26/2017     No current facility-administered medications for this visit.      Past Medical History:   Diagnosis Date     Seizures (H)      Past Surgical History:   Procedure Laterality Date     ARTHROSCOPY KNEE RT/LT       HERNIA REPAIR, INGUINAL RT/LT      Hernia Repair, RT/LT     Allergies   Allergen Reactions     Penicillins      Social History     Social History     Marital status: Single     Spouse name: N/A     Number of children: N/A     Years of education: N/A     Occupational History     Not on file.     Social History Main Topics     Smoking status: Never Smoker     Smokeless tobacco: Never Used     Alcohol use Yes      Comment: rare     Drug use: No     Sexual activity: Yes     Other Topics Concern     Not on file     Social History Narrative     Family History   Problem Relation Age of Onset     HEART DISEASE Mother      Cancer - colorectal Father      late 60's     Thyroid Disease Father      HEART DISEASE Other      CANCER Other      HEART DISEASE Sister           REVIEW OF SYSTEMS:  General: negative, fever, chills, night sweats  Skin: negative, acne, rash and scaling  Eyes: negative, double vision, eye pain and photophobia  Ears/Nose/Throat: negative, nasal congestion and purulent rhinorrhea  Respiratory: No cough, No hemoptysis and negative  Cardiovascular: negative, palpitations, tachycardia, irregular heart beat, chest pain, exertional chest pain or pressure, paroxysmal nocturnal dyspnea and orthopnea  Gastrointestinal: negative, dysphagia, nausea and vomiting  Genitourinary: negative, nocturia, dysuria and frequency  Musculoskeletal: negative, fracture,  back pain and neck pain  Neurologic: negative, headaches, syncope, stroke and paralysis  Psychiatric: negative, nervous breakdown, thoughts of self-harm and thoughts of hurting someone else  Hematologic/Lymphatic/Immunologic: negative, bleeding disorder, chills and fever  Endocrine: negative, cold intolerance, heat intolerance and hot flashes       OBJECTIVE:  Blood pressure 113/87, pulse 121, weight 88.7 kg (195 lb 9.6 oz), SpO2 95 %.  General Appearance: healthy, alert, active and no distress  Head: Normocephalic. No masses, lesions, tenderness or abnormalities  Eyes: conjuctiva clear, PERRL, EOM intact  Ears: External ears normal. Canals clear. TM's normal.  Nose: Nares normal  Mouth: normal  Neck: Supple, no cervical adenopathy, no thyromegaly  Lungs: clear to auscultation  Cardiac: regular rate and rhythm, normal S1 and S2, no murmur  Abdomen: Soft, nontender.  Normal bowel sounds.  No hepatosplenomegaly or abnormal masses  Extremities: no peripheral edema, peripheral pulses normal  Musculoskeletal: negative  Neurological: Cranial nerves 2-12 intact, motor strength intact       ASSESSMENT/PLAN:  52 yr old previously healthy male presents 1yr H/O exertional dizziness/SOB.  No significant CAD risk factors.  Massive PE in 2/2015,on coumadin since then.  Recent EKG reviewed. Aflutter. Rate ~120 BPM.  Records from [facility] reviewed.  EKG-NSR. 2015.  Echo-normal LV/RV size and function 2015.  Need to assess LV function and plan for cardioversion.  As he had occasional dips in his INR and is being checked every 5 weeks, will plan for a MEGHA,cardioversion as we can also assess LV function grossly.  Explained procedure and will schedule ASAP.  Per orders.   Return to Clinic 3 months .

## 2017-04-26 NOTE — NURSING NOTE
Cardiac Testing: Patient given instructions regarding  transesophageal echocardiogram. Discussed purpose, preparation, procedure and when to expect results reported back to the patient. Patient demonstrated understanding of this information and agreed to call with further questions or concerns.    Med Reconcile: Reviewed and verified all current medications with the patient. The updated medication list was printed and given to the patient.    Return Appointment: Patient given instructions regarding scheduling next clinic visit, depending on results of cardioversion. Patient demonstrated understanding of this information and agreed to call with further questions or concerns.    EP Procedure: Patient given instructions regarding  cardioversion Discussed purpose, preparation, and procedure with the patient. Patient demonstrated understanding of this information and agreed to call with further questions or concerns.    Patient stated he understood all health information given and agreed to call with further questions or concerns.    Megan Rider RN

## 2017-04-26 NOTE — NURSING NOTE
"Chief Complaint   Patient presents with     Shortness of Breath     dizziness, SOB, new a-flutter.  Denies any chest apin or dizziness today. C/o a brief pain on the top of his left leg over the weekend. States get winded  easily with little exertion.  Feels more tired than normal.       Initial /87 (BP Location: Left arm, Patient Position: Chair, Cuff Size: Adult Regular)  Pulse 121  Wt 195 lb 9.6 oz (88.7 kg)  SpO2 95%  BMI 28.89 kg/m2 Estimated body mass index is 28.89 kg/(m^2) as calculated from the following:    Height as of an earlier encounter on 4/26/17: 5' 9\" (1.753 m).    Weight as of this encounter: 195 lb 9.6 oz (88.7 kg)..  BP completed using cuff size: regular    Shabnam Paula CMA    "

## 2017-04-26 NOTE — NURSING NOTE
"Chief Complaint   Patient presents with     Consult     Thyroid Nodule(s). Ultrasound done in 2015       Initial Resp 16  Ht 1.753 m (5' 9\")  Wt 88.7 kg (195 lb 9.6 oz)  BMI 28.89 kg/m2 Estimated body mass index is 28.89 kg/(m^2) as calculated from the following:    Height as of this encounter: 1.753 m (5' 9\").    Weight as of this encounter: 88.7 kg (195 lb 9.6 oz).  Medication Reconciliation: complete     Roxy Saldana MA    "

## 2017-04-26 NOTE — MR AVS SNAPSHOT
After Visit Summary   4/26/2017    Arslan Clinton    MRN: [id number]           Patient Information     Date Of Birth          1965        Visit Information        Provider Department      4/26/2017 10:15 AM Rafal Cotton MD [facility]        Today's Diagnoses     Thyroid nodule    -  1      Care Instructions    General Scheduling Information    ENT Clinic Locations Clinic Hours Telephone Number     [facility]  [address] [facility] [place] [address]   Tuesday:       8:00am -- 4:00pm    Wednesday:  8:00am - 4:00pm   To schedule an appointment with   Dr. Cotton,   please contact our   Specialty Scheduling Department at:     [phone]       [facility]  [address] [place] [address]   Friday:          8:00am - 4:00pm         Urgent Care Locations Clinic Hours Telephone Numbers     [facility] [place]  [address] [place] [address]     Monday-Friday:     11:00pm - 9:00pm    Saturday-Sunday:  9:00am - 5:00pm   [phone]     [facility]  [address] [place] [address]     Monday-Friday:      5:00pm - 9:00pm     Saturday-Sunday:  9:00am - 5:00pm   [phone]     [facility]  [address] [place] [address]    Appointments  Primary and Specialty Care:  [phone]      Patient Name: Arslan Clinton  YOB: 1965                        Home Phone: [phone] (home)     Test or Treatment: FNA (Fine Needle Aspiration)  Department Scheduled with: Pathology  Symptoms/Diagnosis: The encounter diagnosis was Thyroid nodule.    Referring: Dr. Cotton  Clinic or Hospital : [facility]    Phone: [phone]          Follow-ups after your visit        Your next 10 appointments already scheduled     Apr 26, 2017 11:00 AM CDT   New Visit with KOKO Gastelum MD   [facility] ([facility])    [address] [place] [address]  "[place] [address]   [phone]            May 05, 2017  7:15 AM CDT   [facility]ticoagulation Visit with AN ANTI COAG   [facility]    [address] [facility] [id number] [phone]              Future tests that were ordered for you today     Open Future Orders        Priority Expected Expires Ordered    US Biopsy Thyroid Fine Needle Aspiration Routine  2018    US Head Neck Soft Tissue Routine 2017            Who to contact     If you have questions or need follow up information about today's clinic visit or your schedule please contact [facility] directly at [phone].  Normal or non-critical lab and imaging results will be communicated to you by MyChart, letter or phone within 4 business days after the clinic has received the results. If you do not hear from us within 7 days, please contact the clinic through MyChart or phone. If you have a critical or abnormal lab result, we will notify you by phone as soon as possible.  Submit refill requests through [employer] or call your pharmacy and they will forward the refill request to us. Please allow 3 business days for your refill to be completed.          Additional Information About Your Visit        [employer] Information     [employer] lets you send messages to your doctor, view your test results, renew your prescriptions, schedule appointments and more. To sign up, go to [email or web address] . Click on \"Log in\" on the left side of the screen, which will take you to the Welcome page. Then click on \"Sign up Now\" on the right side of the page.     You will be asked to enter the access code listed below, as well as some personal information. Please follow the directions to create your username and password.     Your access code is: [email or web address]  Expires: 2017 11:45 AM     Your access code will  in 90 days. If you need help or a new code, please call your [place] " "clinic or [phone].        Care EveryWhere ID     This is your Care EveryWhere ID. This could be used by other organizations to access your [facility] medical records  [id number]        Your Vitals Were     Respirations Height BMI (Body Mass Index)             16 1.753 m (5' 9\") 28.89 kg/m2          Blood Pressure from Last 3 Encounters:   04/18/17 108/67   04/12/17 99/76   03/24/16 118/76    Weight from Last 3 Encounters:   04/26/17 88.7 kg (195 lb 9.6 oz)   04/18/17 86.9 kg (191 lb 9.6 oz)   04/12/17 87.5 kg (193 lb)               Primary Care Provider Office Phone # Fax #    Cedric Hodgson PA-C [phone]       [facility] [address] [facility] [address]        Thank you!     Thank you for choosing [facility]  for your care. Our goal is always to provide you with excellent care. Hearing back from our patients is one way we can continue to improve our services. Please take a few minutes to complete the written survey that you may receive in the mail after your visit with us. Thank you!             Your Updated Medication List - Protect others around you: Learn how to safely use, store and throw away your medicines at www.disposemymeds.org.          This list is accurate as of: 4/26/17 10:40 AM.  Always use your most recent med list.                   Brand Name Dispense Instructions for use    DILANTIN 100 MG CR capsule   Generic drug:  phenytoin      Take 5 capsules by mouth daily.       fluticasone 50 MCG/ACT spray    FLONASE     Spray 1 spray into both nostrils daily       metoprolol 25 MG 24 hr tablet    TOPROL-XL    30 tablet    Take 1 tablet (25 mg) by mouth daily       TYLENOL EXTRA STRENGTH PO      Take 2 tablets by mouth every 6 hours as needed       warfarin 5 MG tablet    COUMADIN    135 tablet    Take daily as directed. Current dose 5 mg M,W,F and  7.5 mg Su,Tu,TH,Sa.         "

## 2017-04-28 ENCOUNTER — SURGERY (OUTPATIENT)
Age: 52
End: 2017-04-28

## 2017-04-28 ENCOUNTER — ANESTHESIA EVENT (OUTPATIENT)
Dept: SURGERY | Facility: CLINIC | Age: 52
End: 2017-04-28
Payer: COMMERCIAL

## 2017-04-28 ENCOUNTER — HOSPITAL ENCOUNTER (OUTPATIENT)
Facility: CLINIC | Age: 52
Discharge: HOME | End: 2017-04-28
Attending: INTERNAL MEDICINE | Admitting: INTERNAL MEDICINE
Payer: COMMERCIAL

## 2017-04-28 ENCOUNTER — ANESTHESIA (OUTPATIENT)
Dept: SURGERY | Facility: CLINIC | Age: 52
End: 2017-04-28
Payer: COMMERCIAL

## 2017-04-28 ENCOUNTER — HOSPITAL ENCOUNTER (OUTPATIENT)
Dept: CARDIOLOGY | Facility: CLINIC | Age: 52
End: 2017-04-28
Attending: INTERNAL MEDICINE | Admitting: INTERNAL MEDICINE
Payer: COMMERCIAL

## 2017-04-28 ENCOUNTER — APPOINTMENT (OUTPATIENT)
Dept: MEDSURG UNIT | Facility: CLINIC | Age: 52
End: 2017-04-28
Attending: INTERNAL MEDICINE
Payer: COMMERCIAL

## 2017-04-28 VITALS
SYSTOLIC BLOOD PRESSURE: 102 MMHG | RESPIRATION RATE: 16 BRPM | HEART RATE: 75 BPM | DIASTOLIC BLOOD PRESSURE: 74 MMHG | OXYGEN SATURATION: 99 %

## 2017-04-28 VITALS
OXYGEN SATURATION: 99 % | HEART RATE: 113 BPM | DIASTOLIC BLOOD PRESSURE: 89 MMHG | RESPIRATION RATE: 16 BRPM | SYSTOLIC BLOOD PRESSURE: 113 MMHG

## 2017-04-28 VITALS
SYSTOLIC BLOOD PRESSURE: 98 MMHG | OXYGEN SATURATION: 98 % | HEART RATE: 78 BPM | DIASTOLIC BLOOD PRESSURE: 67 MMHG | RESPIRATION RATE: 16 BRPM

## 2017-04-28 DIAGNOSIS — I48.0 PAROXYSMAL ATRIAL FIBRILLATION (H): Primary | ICD-10-CM

## 2017-04-28 LAB
ANION GAP SERPL CALCULATED.3IONS-SCNC: 6 MMOL/L (ref 3–14)
BUN SERPL-MCNC: 21 MG/DL (ref 7–30)
CALCIUM SERPL-MCNC: 8.6 MG/DL (ref 8.5–10.1)
CHLORIDE SERPL-SCNC: 107 MMOL/L (ref 94–109)
CO2 SERPL-SCNC: 29 MMOL/L (ref 20–32)
CREAT SERPL-MCNC: 1.04 MG/DL (ref 0.66–1.25)
GFR SERPL CREATININE-BSD FRML MDRD: 75 ML/MIN/1.7M2
GLUCOSE SERPL-MCNC: 91 MG/DL (ref 70–99)
INR PPP: 2.13 (ref 0.86–1.14)
MAGNESIUM SERPL-MCNC: 1.9 MG/DL (ref 1.6–2.3)
POTASSIUM SERPL-SCNC: 4.3 MMOL/L (ref 3.4–5.3)
SODIUM SERPL-SCNC: 142 MMOL/L (ref 133–144)

## 2017-04-28 PROCEDURE — 93320 DOPPLER ECHO COMPLETE: CPT | Mod: 26 | Performed by: INTERNAL MEDICINE

## 2017-04-28 PROCEDURE — 93005 ELECTROCARDIOGRAM TRACING: CPT

## 2017-04-28 PROCEDURE — 37000008 ZZH ANESTHESIA TECHNICAL FEE, 1ST 30 MIN

## 2017-04-28 PROCEDURE — 92960 CARDIOVERSION ELECTRIC EXT: CPT | Performed by: INTERNAL MEDICINE

## 2017-04-28 PROCEDURE — 40000166 ZZH STATISTIC PP CARE STAGE 1

## 2017-04-28 PROCEDURE — 40000065 ZZH STATISTIC EKG NON-CHARGEABLE

## 2017-04-28 PROCEDURE — 83735 ASSAY OF MAGNESIUM: CPT | Performed by: INTERNAL MEDICINE

## 2017-04-28 PROCEDURE — 93325 DOPPLER ECHO COLOR FLOW MAPG: CPT | Mod: 26 | Performed by: INTERNAL MEDICINE

## 2017-04-28 PROCEDURE — 80048 BASIC METABOLIC PNL TOTAL CA: CPT | Performed by: INTERNAL MEDICINE

## 2017-04-28 PROCEDURE — 25000125 ZZHC RX 250: Performed by: NURSE ANESTHETIST, CERTIFIED REGISTERED

## 2017-04-28 PROCEDURE — 93312 ECHO TRANSESOPHAGEAL: CPT | Mod: 26 | Performed by: INTERNAL MEDICINE

## 2017-04-28 PROCEDURE — 92960 CARDIOVERSION ELECTRIC EXT: CPT | Mod: GC | Performed by: INTERNAL MEDICINE

## 2017-04-28 PROCEDURE — 25000132 ZZH RX MED GY IP 250 OP 250 PS 637: Performed by: INTERNAL MEDICINE

## 2017-04-28 PROCEDURE — 85610 PROTHROMBIN TIME: CPT | Performed by: INTERNAL MEDICINE

## 2017-04-28 PROCEDURE — 25000128 H RX IP 250 OP 636: Performed by: INTERNAL MEDICINE

## 2017-04-28 PROCEDURE — 92960 CARDIOVERSION ELECTRIC EXT: CPT

## 2017-04-28 PROCEDURE — 93010 ELECTROCARDIOGRAM REPORT: CPT | Mod: 76 | Performed by: INTERNAL MEDICINE

## 2017-04-28 PROCEDURE — 99153 MOD SED SAME PHYS/QHP EA: CPT

## 2017-04-28 PROCEDURE — 93320 DOPPLER ECHO COMPLETE: CPT

## 2017-04-28 PROCEDURE — 25000125 ZZHC RX 250: Performed by: INTERNAL MEDICINE

## 2017-04-28 PROCEDURE — 99152 MOD SED SAME PHYS/QHP 5/>YRS: CPT

## 2017-04-28 RX ORDER — NALOXONE HYDROCHLORIDE 0.4 MG/ML
.1-.4 INJECTION, SOLUTION INTRAMUSCULAR; INTRAVENOUS; SUBCUTANEOUS
Status: DISCONTINUED | OUTPATIENT
Start: 2017-04-28 | End: 2017-04-29 | Stop reason: HOSPADM

## 2017-04-28 RX ORDER — FENTANYL CITRATE 50 UG/ML
50-100 INJECTION, SOLUTION INTRAMUSCULAR; INTRAVENOUS
Status: COMPLETED | OUTPATIENT
Start: 2017-04-28 | End: 2017-04-28

## 2017-04-28 RX ORDER — FENTANYL CITRATE 50 UG/ML
25-50 INJECTION, SOLUTION INTRAMUSCULAR; INTRAVENOUS
Status: DISCONTINUED | OUTPATIENT
Start: 2017-04-28 | End: 2017-04-29 | Stop reason: HOSPADM

## 2017-04-28 RX ORDER — POTASSIUM CHLORIDE 1500 MG/1
40 TABLET, EXTENDED RELEASE ORAL
Status: DISCONTINUED | OUTPATIENT
Start: 2017-04-28 | End: 2017-04-29 | Stop reason: HOSPADM

## 2017-04-28 RX ORDER — FLUMAZENIL 0.1 MG/ML
0.2 INJECTION, SOLUTION INTRAVENOUS
Status: DISCONTINUED | OUTPATIENT
Start: 2017-04-28 | End: 2017-04-29 | Stop reason: HOSPADM

## 2017-04-28 RX ORDER — ATROPINE SULFATE 0.1 MG/ML
.5-1 INJECTION INTRAVENOUS
Status: DISCONTINUED | OUTPATIENT
Start: 2017-04-28 | End: 2017-04-29 | Stop reason: HOSPADM

## 2017-04-28 RX ORDER — POTASSIUM CHLORIDE 1500 MG/1
20 TABLET, EXTENDED RELEASE ORAL
Status: DISCONTINUED | OUTPATIENT
Start: 2017-04-28 | End: 2017-04-29 | Stop reason: HOSPADM

## 2017-04-28 RX ORDER — SODIUM CHLORIDE 9 MG/ML
1000 INJECTION, SOLUTION INTRAVENOUS CONTINUOUS
Status: DISCONTINUED | OUTPATIENT
Start: 2017-04-28 | End: 2017-04-29 | Stop reason: HOSPADM

## 2017-04-28 RX ADMIN — TOPICAL ANESTHETIC 1 ML: 200 SPRAY DENTAL; PERIODONTAL at 11:04

## 2017-04-28 RX ADMIN — LIDOCAINE HYDROCHLORIDE 30 ML: 20 SOLUTION ORAL; TOPICAL at 11:04

## 2017-04-28 RX ADMIN — MIDAZOLAM 2 MG: 1 INJECTION INTRAMUSCULAR; INTRAVENOUS at 10:54

## 2017-04-28 RX ADMIN — FENTANYL CITRATE 100 MCG: 50 INJECTION INTRAMUSCULAR; INTRAVENOUS at 10:54

## 2017-04-28 RX ADMIN — METHOHEXITAL SODIUM 50 MG: 500 INJECTION, POWDER, LYOPHILIZED, FOR SOLUTION INTRAMUSCULAR; INTRAVENOUS; RECTAL at 11:51

## 2017-04-28 NOTE — ANESTHESIA POSTPROCEDURE EVALUATION
Patient: Arslan Clinton    Procedure(s):  Anesthesia Cardioversion     Diagnosis:atrial fibrillation   Diagnosis Additional Information: No value filed.    Anesthesia Type:  General    Note:  Anesthesia Post Evaluation    Patient location during evaluation: PACU  Patient participation: Able to fully participate in evaluation  Level of consciousness: awake and alert  Pain management: adequate  Airway patency: patent  Cardiovascular status: acceptable and hemodynamically stable  Respiratory status: acceptable and spontaneous ventilation  Hydration status: acceptable  PONV: none     Anesthetic complications: None          Last vitals:  Vitals:    04/28/17 1238 04/28/17 1245 04/28/17 1300   BP: 106/74 100/71 98/67   Pulse: 84 78 78   Resp: 16 16 16   SpO2: 96% 98%          Electronically Signed By: Chris Avery MD  April 28, 2017  3:31 PM

## 2017-04-28 NOTE — ANESTHESIA PREPROCEDURE EVALUATION
"                      Anesthesia Plan      History & Physical Review      ASA Status:  2 .    NPO Status:  > 8 hours    Plan for General with Intravenous induction.          Postoperative Care      Consents  Anesthetic plan, risks, benefits and alternatives discussed with:  Patient..                        ANESTHESIA PREOP EVALUATION    Procedure: ANESTHESIA CARDIOVERSION    HPI: Atrial Fibrillation    PMHx/PSHx/ROS:  Past Medical History:   Diagnosis Date     Seizures (H)        Past Surgical History:   Procedure Laterality Date     ARTHROSCOPY KNEE RT/LT       HERNIA REPAIR, INGUINAL RT/LT      Hernia Repair, RT/LT         Past Anes Hx: No personal or family h/o anesthesia problems    Soc Hx:   Tobacco:   EtOH:     Allergies:   Allergies   Allergen Reactions     Penicillins        Meds:   No prescriptions prior to admission.       Current Outpatient Prescriptions   Medication Sig Dispense Refill     fluticasone (FLONASE) 50 MCG/ACT spray Spray 1 spray into both nostrils daily Reported on 4/26/2017       metoprolol (TOPROL-XL) 25 MG 24 hr tablet Take 1 tablet (25 mg) by mouth daily 30 tablet 1     warfarin (COUMADIN) 5 MG tablet Take daily as directed. Current dose 5 mg M,W,F and  7.5 mg Su,Tu,TH,Sa. 135 tablet 0     Acetaminophen (TYLENOL EXTRA STRENGTH PO) Take 2 tablets by mouth every 6 hours as needed Reported on 4/26/2017       phenytoin (DILANTIN) 100 MG ER capsule Take 5 capsules by mouth daily.         Physical Exam:  VS: T Data Unavailable, P Data Unavailable, BP Data Unavailable, R Data Unavailable, SpO2       Weight:   Wt Readings from Last 2 Encounters:   04/26/17 88.7 kg (195 lb 9.6 oz)   04/26/17 88.7 kg (195 lb 9.6 oz)     Height:   Ht Readings from Last 2 Encounters:   04/26/17 1.753 m (5' 9\")   04/18/17 1.753 m (5' 9\")       NPO Status:     Labs:      BMP:  Lab Results   Component Value Date     04/12/2017      Lab Results   Component Value Date    POTASSIUM 4.2 04/12/2017     Lab Results "   Component Value Date    CHLORIDE 104 2017     Lab Results   Component Value Date    KATHY 9.0 2017     Lab Results   Component Value Date    CO2 27 2017     Lab Results   Component Value Date    BUN 15 2017     Lab Results   Component Value Date    CR 1.07 2017     Lab Results   Component Value Date    GLC 89 2017        CBC:  Lab Results   Component Value Date    WBC 6.3 2017     Lab Results   Component Value Date    HGB 15.1 2017     Lab Results   Component Value Date    HCT 44.9 2017     Lab Results   Component Value Date     2017        Imaging:  No images are attached to the encounter.    EK/28/2017  8:38 AM

## 2017-04-28 NOTE — PROGRESS NOTES
Pt arrived in ECHO department  for scheduled MEGHA/cardioversion.   Procedure explained, questions answered and consent signed. Discharge instructions discussed with patient.  Pt's throat sprayed at 1045, therefore pt will not be able to eat or drink until 2 hours after at 1245.  Informed pt of this time and encouraged to start with warm fluids and soft foods.    Pt tolerated procedure well, and was given a total of 100 mcg IV fentanyl and 2 mg IV versed for conscious sedation.  Pt denied throat or chest pain after MEGHA complete.   MEGHA probe GE probe used for procedure.  No clots visualized on MEGHA so proceeded with DCCV.  Anesthesia gave pt 50 mg IV brevitol for sedation and pt was DCCV at 120 Joules to a SR.  Post EKG done.  Pt denied C.P or throat pain after procedure and was transferred back to  after awake and VSS.  Escorted back to  on litter.  Report given to DEBBIE Huynh RN.

## 2017-04-28 NOTE — PROGRESS NOTES
Ridge arrived on 2a to prep for a cardioversion.  Pre-procedure assessment is complete.  EKG is done.  Labs have been collected.  He is prepped and ready for consent.

## 2017-04-28 NOTE — ANESTHESIA CARE TRANSFER NOTE
Patient: Arslan Clinton    Procedure(s):  Anesthesia Cardioversion     Diagnosis: atrial fibrillation   Diagnosis Additional Information: No value filed.    Anesthesia Type:   No value filed.     Note:  Airway :Nasal Cannula  Patient transferred to:Telemetry/Step Down Unit  Comments: Anesthesia Care Transfer Note      Transfer to:  PACU    Patient Vital Signs:  Stable    Airway:  None    Patient on NC supplemental O2.  Cardioverted x1.  Patient alert and breathing comfortably.  VSS.  Care transferred with report to Echo RN.    Damon Faria CRNA      Vitals: (Last set prior to Anesthesia Care Transfer)    CRNA VITALS  4/28/2017 1123 - 4/28/2017 1153      4/28/2017             NIBP: 105/74    Pulse: 85    Resp Rate (observed): 10                Electronically Signed By: RONI Pope CRNA  April 28, 2017  11:53 AM

## 2017-04-28 NOTE — IP AVS SNAPSHOT
[address] [address]                                       After Visit Summary   4/28/2017    Arslan Clinton    MRN: [id number]           After Visit Summary Signature Page     I have received my discharge instructions, and my questions have been answered. I have discussed any challenges I see with this plan with the nurse or doctor.    ..........................................................................................................................................  Patient/Patient Representative Signature      ..........................................................................................................................................  Patient Representative Print Name and Relationship to Patient    ..................................................               ................................................  Date                                            Time    ..........................................................................................................................................  Reviewed by Signature/Title    ...................................................              ..............................................  Date                                                            Time

## 2017-04-28 NOTE — PROGRESS NOTES
Ridge returned to 2a after having a MEGHA and cardioversion.  His vital signs are stable.  He is resting comfortably.  Significant other is at bedside.  He is NPO until 1245.   1330:  Patient tolerated po.  Discharge instructions were reviewed prior to discharge.  Patient discharged to home accompanied by his  Spouse.

## 2017-04-28 NOTE — DISCHARGE INSTRUCTIONS
[facility]  Going Home after Sedation      For 24 hours:    An adult should stay with you.    Relax and take it easy.    DO NOT make any important legal decisions.    DO NOT drive or operate machines at home or at work.    Resume your regular diet and drink plenty of fluids.    CALL THE PHYSICIAN IF:    You develop nausea or vomiting    You develop hives or a rash or any unexplained itching    ADDITIONAL INSTRUCTIONS:    [facility] INTERVENTIONAL RADIOLOGY DEPARTMENT        Procedure Physician:    Dr. Torres                             Date of procedure:April 28, 2017        Telephone numbers:                                                      [phone]     . Ask for the cardiologist on call. Someone is  available 24 hours/day        [facility] toll free number: [phone]  Monday-Friday 8:00 am to 4:30 pm

## 2017-04-28 NOTE — PROCEDURES
DC Cardioversion Procedure note    Pre-procedure diagnosis: Atrial fibrillation    Procedure: DC Cardioversion    The risks and benefits were explained to the patient prior to the procedure and informed consent was obtained. Adequate anticoagulation level was confirmed. Pulse oximetry, ECG, and ventilation were monitored. Brevitol 50 mg was used by the staff anesthesiologist without complications. Please refer to their note for further details. Zoll pads were placed appropriately for DC cardioversion. One single 120 J synchronized shock was delivered. Normal sinus rhythm was successfully restored and confirmed by a 12-lead ECG. The patient remained hemodynamically stable throughout.    Complications: none    Marco Henson MD  CV Fellow   [phone]      ELECTROPHYSIOLOGY STAFF  I was present for, and supervised/performed, the cardioversion as documented by Dr. Henson.  Heladio Markham

## 2017-04-28 NOTE — IP AVS SNAPSHOT
MRN:[id number]                      After Visit Summary   4/28/2017    Arslan Clinton    MRN: [id number]           Visit Information        Department      4/28/2017  9:02 AM [address] [facility]          Review of your medicines      UNREVIEWED medicines. Ask your doctor about these medicines        Dose / Directions    DILANTIN 100 MG CR capsule   Generic drug:  phenytoin        Dose:  5 capsule   Take 5 capsules by mouth daily.   Refills:  0       fluticasone 50 MCG/ACT spray   Commonly known as:  FLONASE        Dose:  1 spray   Spray 1 spray into both nostrils daily Reported on 4/26/2017   Refills:  0       metoprolol 25 MG 24 hr tablet   Commonly known as:  TOPROL-XL   Used for:  Atrial flutter, unspecified type (H)        Dose:  25 mg   Take 1 tablet (25 mg) by mouth daily   Quantity:  30 tablet   Refills:  1       TYLENOL EXTRA STRENGTH PO        Dose:  2 tablet   Take 2 tablets by mouth every 6 hours as needed Reported on 4/26/2017   Refills:  0       warfarin 5 MG tablet   Commonly known as:  COUMADIN   Used for:  Pulmonary embolism (H)        Take daily as directed. Current dose 5 mg M,W,F and  7.5 mg Jenkins,Tu,TH,Sa.   Quantity:  135 tablet   Refills:  0                Protect others around you: Learn how to safely use, store and throw away your medicines at www.disposemymeds.org.         Follow-ups after your visit        Your next 10 appointments already scheduled     May 05, 2017  7:15 AM CDT   [facility]    [address] [facility] [address] [phone]               Care Instructions        Further instructions from your care team       [facility]  Going Home after Sedation      For 24 hours:    An adult should stay with you.    Relax and take it easy.    DO NOT make any important legal decisions.    DO NOT drive or operate machines at home or at work.    Resume your  "regular diet and drink plenty of fluids.    CALL THE PHYSICIAN IF:    You develop nausea or vomiting    You develop hives or a rash or any unexplained itching    ADDITIONAL INSTRUCTIONS:    [facility] INTERVENTIONAL RADIOLOGY DEPARTMENT        Procedure Physician:    Dr. Torres                             Date of procedure:2017        Telephone numbers:                                                      [phone]     . Ask for the cardiologist on call. Someone is  available 24 hours/day        [facility] toll free number: [phone]  Monday-Friday 8:00 am to 4:30 pm                                                                                                                                                                                                                        Additional Information About Your Visit        [employer] Information     [employer] lets you send messages to your doctor, view your test results, renew your prescriptions, schedule appointments and more. To sign up, go to [email or web address] . Click on \"Log in\" on the left side of the screen, which will take you to the Welcome page. Then click on \"Sign up Now\" on the right side of the page.     You will be asked to enter the access code listed below, as well as some personal information. Please follow the directions to create your username and password.     Your access code is: [email or web address]  Expires: 2017 11:45 AM     Your access code will  in 90 days. If you need help or a new code, please call your [facility] clinic or [phone].        Care EveryWhere ID     This is your Care EveryWhere ID. This could be used by other organizations to access your [facility] medical records  [id number]        Your Vitals Were     Blood Pressure Pulse Respirations Pulse Oximetry          101/71 80 14 96%         Primary Care Provider Office Phone # Fax #    Cedric Hodgson PA-C [phone]      Thank you!     " Thank you for choosing [facility] for your care. Our goal is always to provide you with excellent care. Hearing back from our patients is one way we can continue to improve our services. Please take a few minutes to complete the written survey that you may receive in the mail after you visit with us. Thank you!             Medication List: This is a list of all your medications and when to take them. Check marks below indicate your daily home schedule. Keep this list as a reference.      Medications           Morning Afternoon Evening Bedtime As Needed    DILANTIN 100 MG CR capsule   Take 5 capsules by mouth daily.   Generic drug:  phenytoin                                fluticasone 50 MCG/ACT spray   Commonly known as:  FLONASE   Spray 1 spray into both nostrils daily Reported on 4/26/2017                                metoprolol 25 MG 24 hr tablet   Commonly known as:  TOPROL-XL   Take 1 tablet (25 mg) by mouth daily                                TYLENOL EXTRA STRENGTH PO   Take 2 tablets by mouth every 6 hours as needed Reported on 4/26/2017                                warfarin 5 MG tablet   Commonly known as:  COUMADIN   Take daily as directed. Current dose 5 mg M,W,F and  7.5 mg Jenkins,Tu,TH,Sa.

## 2017-05-01 ENCOUNTER — TELEPHONE (OUTPATIENT)
Dept: CARDIOLOGY | Facility: CLINIC | Age: 52
End: 2017-05-01

## 2017-05-01 DIAGNOSIS — I48.92 ATRIAL FLUTTER, UNSPECIFIED TYPE (H): ICD-10-CM

## 2017-05-01 DIAGNOSIS — R93.1 DECREASED CARDIAC EJECTION FRACTION: Primary | ICD-10-CM

## 2017-05-01 LAB — INTERPRETATION ECG - MUSE: NORMAL

## 2017-05-01 NOTE — TELEPHONE ENCOUNTER
----- Message from KOKO Gastelum MD sent at 4/28/2017  1:24 PM CDT -----  Had cardioversion.  EF 20-25%.  Will return to clinic in 2 months with echo.  Thanks.

## 2017-05-02 LAB — INTERPRETATION ECG - MUSE: NORMAL

## 2017-05-02 NOTE — TELEPHONE ENCOUNTER
Patient informed and scheduled for an echo 6/26 and 2 mo fu appt with Dr. Terrazas 6/28.  Oliva Nickerson RN

## 2017-05-04 ENCOUNTER — TELEPHONE (OUTPATIENT)
Dept: NURSING | Facility: CLINIC | Age: 52
End: 2017-05-04

## 2017-05-04 NOTE — TELEPHONE ENCOUNTER
Patient want to speak to Tierra INR Nurse, because he thinks that since he did an INR at the hospital recently that should be  Fine.  Please call patient at soonest convenience.. Thank you!

## 2017-05-04 NOTE — TELEPHONE ENCOUNTER
Spoke with patient on phone. Reports had INR done last week prior to MEGHA and was therapeutic. Warfarin was not held.  No medications changes, no missed dose and no bleeding concerns. Will cancel INR tomorrow and reschedule in 4 wks. Continue same warfarin dose. Call if any changes or concerns. Tierra Olivarez RN

## 2017-05-19 DIAGNOSIS — I26.99 PULMONARY EMBOLISM (H): ICD-10-CM

## 2017-05-19 RX ORDER — WARFARIN SODIUM 5 MG/1
TABLET ORAL
Qty: 135 TABLET | Refills: 0 | OUTPATIENT
Start: 2017-05-19

## 2017-06-05 ENCOUNTER — TELEPHONE (OUTPATIENT)
Dept: FAMILY MEDICINE | Facility: CLINIC | Age: 52
End: 2017-06-05

## 2017-06-05 ENCOUNTER — ANTICOAGULATION THERAPY VISIT (OUTPATIENT)
Dept: NURSING | Facility: CLINIC | Age: 52
End: 2017-06-05
Payer: COMMERCIAL

## 2017-06-05 DIAGNOSIS — Z79.01 LONG-TERM (CURRENT) USE OF ANTICOAGULANTS: ICD-10-CM

## 2017-06-05 DIAGNOSIS — I26.99 OTHER PULMONARY EMBOLISM WITHOUT ACUTE COR PULMONALE, UNSPECIFIED CHRONICITY (H): Primary | ICD-10-CM

## 2017-06-05 DIAGNOSIS — I26.99 OTHER PULMONARY EMBOLISM WITHOUT ACUTE COR PULMONALE, UNSPECIFIED CHRONICITY (H): ICD-10-CM

## 2017-06-05 LAB — INR POINT OF CARE: 3 (ref 0.86–1.14)

## 2017-06-05 PROCEDURE — 36416 COLLJ CAPILLARY BLOOD SPEC: CPT

## 2017-06-05 PROCEDURE — 85610 PROTHROMBIN TIME: CPT | Mod: QW

## 2017-06-05 PROCEDURE — 99207 ZZC NO CHARGE NURSE ONLY: CPT

## 2017-06-05 NOTE — MR AVS SNAPSHOT
Arslan Clinton   6/5/2017 7:15 AM   Anticoagulation Therapy Visit    Description:  52 year old male   Provider:  AN ANTI COAG   Department:  An Nurse           INR as of 6/5/2017     Today's INR 3.0      Anticoagulation Summary as of 6/5/2017     INR goal 2.0-3.0   Today's INR 3.0   Full instructions 5 mg on Mon, Wed, Fri; 7.5 mg all other days   Next INR check 7/12/2017    Indications   Pulmonary embolism (H) [I26.99]  Long-term (current) use of anticoagulants [Z79.01] [Z79.01]         Your next Anticoagulation Clinic appointment(s)     Jul 12, 2017  7:15 AM CDT   Anticoagulation [facility] with AN ANTI COAG   [facility]    [address] [facility] [address] [phone]              Contact Numbers     [facility]  Please call  [phone] to cancel and/or reschedule your appointment, or with any problems or questions regarding your therapy.        June 2017 Details    Sun Mon Tue Wed Thu Fri Sat         1               2               3                 4               5      5 mg   See details      6      7.5 mg         7      5 mg         8      7.5 mg         9      5 mg         10      7.5 mg           11      7.5 mg         12      5 mg         13      7.5 mg         14      5 mg         15      7.5 mg         16      5 mg         17      7.5 mg           18      7.5 mg         19      5 mg         20      7.5 mg         21      5 mg         22      7.5 mg         23      5 mg         24      7.5 mg           25      7.5 mg         26      5 mg         27      7.5 mg         28      5 mg         29      7.5 mg         30      5 mg           Date Details   06/05 This INR check               How to take your warfarin dose     To take:  5 mg Take 1 of the 5 mg tablets.    To take:  7.5 mg Take 1.5 of the 5 mg tablets.           July 2017 Details    Sun Mon Tue Wed Thu Fri Sat           1      7.5 mg           2      7.5 mg         3      5 mg         4       7.5 mg         5      5 mg         6      7.5 mg         7      5 mg         8      7.5 mg           9      7.5 mg         10      5 mg         11      7.5 mg         12            13               14               15                 16               17               18               19               20               21               22                 23               24               25               26               27               28               29                 30               31                     Date Details   No additional details    Date of next INR:  7/12/2017         How to take your warfarin dose     To take:  5 mg Take 1 of the 5 mg tablets.    To take:  7.5 mg Take 1.5 of the 5 mg tablets.

## 2017-06-05 NOTE — PROGRESS NOTES
ANTICOAGULATION FOLLOW-UP CLINIC VISIT    Patient Name:  Arslan Clinton  Date:  6/5/2017  Contact Type:  Face to Face    SUBJECTIVE:     Patient Findings     Positives No Problem Findings           OBJECTIVE    INR Protime   Date Value Ref Range Status   06/05/2017 3.0 (A) 0.86 - 1.14 Final       ASSESSMENT / PLAN  INR assessment THER    Recheck INR In: 5 WEEKS    INR Location Clinic      Anticoagulation Summary as of 6/5/2017     INR goal 2.0-3.0   Today's INR 3.0   Maintenance plan 5 mg (5 mg x 1) on Mon, Wed, Fri; 7.5 mg (5 mg x 1.5) all other days   Full instructions 5 mg on Mon, Wed, Fri; 7.5 mg all other days   Weekly total 45 mg   No change documented Tierra Olivarez RN   Plan last modified Tierra Olivarez RN (10/7/2016)   Next INR check 7/12/2017   Target end date     Indications   Pulmonary embolism (H) [I26.99]  Long-term (current) use of anticoagulants [Z79.01] [Z79.01]         Anticoagulation Episode Summary     INR check location     Preferred lab     Send INR reminders to AN ANTICOAG CLINIC    Comments       Anticoagulation Care Providers     Provider Role Specialty Phone number    Cedric Hodgson PA-C [facility] [phone]            See the Encounter Report to view Anticoagulation Flowsheet and Dosing Calendar (Go to Encounters tab in chart review, and find the Anticoagulation Therapy Visit)    Dosage adjustment made based on physician directed care plan.    Tierra Olivarez RN

## 2017-06-20 ENCOUNTER — PRE VISIT (OUTPATIENT)
Dept: CARDIOLOGY | Facility: CLINIC | Age: 52
End: 2017-06-20

## 2017-06-20 NOTE — TELEPHONE ENCOUNTER
2 month Follow up with ECHO sched 6/26/17. S/p Cardioversion and MEGHA completed 04/28/2017.    Chart prep complete. All requested testing/labs completed.  Shabnam Paula CMA.

## 2017-06-22 ENCOUNTER — TELEPHONE (OUTPATIENT)
Dept: FAMILY MEDICINE | Facility: CLINIC | Age: 52
End: 2017-06-22

## 2017-06-22 DIAGNOSIS — I26.99 PULMONARY EMBOLISM (H): ICD-10-CM

## 2017-06-22 RX ORDER — WARFARIN SODIUM 5 MG/1
TABLET ORAL
Qty: 135 TABLET | Refills: 0 | Status: SHIPPED | OUTPATIENT
Start: 2017-06-22 | End: 2017-10-04

## 2017-07-12 ENCOUNTER — ANTICOAGULATION THERAPY VISIT (OUTPATIENT)
Dept: NURSING | Facility: CLINIC | Age: 52
End: 2017-07-12
Payer: COMMERCIAL

## 2017-07-12 DIAGNOSIS — Z79.01 LONG-TERM (CURRENT) USE OF ANTICOAGULANTS: ICD-10-CM

## 2017-07-12 DIAGNOSIS — I26.99 OTHER PULMONARY EMBOLISM WITHOUT ACUTE COR PULMONALE, UNSPECIFIED CHRONICITY (H): ICD-10-CM

## 2017-07-12 LAB — INR POINT OF CARE: 2.9 (ref 0.86–1.14)

## 2017-07-12 PROCEDURE — 36416 COLLJ CAPILLARY BLOOD SPEC: CPT

## 2017-07-12 PROCEDURE — 99207 ZZC NO CHARGE NURSE ONLY: CPT

## 2017-07-12 PROCEDURE — 85610 PROTHROMBIN TIME: CPT | Mod: QW

## 2017-07-12 NOTE — MR AVS SNAPSHOT
Arslan Clinton   7/12/2017 7:15 AM   Anticoagulation Therapy Visit    Description:  52 year old male   Provider:  AN ANTI COAG   Department:  An Nurse           INR as of 7/12/2017     Today's INR 2.9      Anticoagulation Summary as of 7/12/2017     INR goal 2.0-3.0   Today's INR 2.9   Full instructions 5 mg on Mon, Wed, Fri; 7.5 mg all other days   Next INR check 8/17/2017    Indications   Pulmonary embolism (H) [I26.99]  Long-term (current) use of anticoagulants [Z79.01] [Z79.01]         Your next Anticoagulation Clinic appointment(s)     Jul 12, 2017  7:15 AM CDT   [facility]    [address] [facility] [address] [phone]            Aug 17, 2017  7:15 AM CDT   Anticoagulation Visit with AN ANTI COAG   [facility]    [address] [facility] [address] [phone]              Contact Numbers     [facility]  Please call  [phone] to cancel and/or reschedule your appointment, or with any problems or questions regarding your therapy.        July 2017 Details    Sun Mon Tue Wed Thu Fri Sat           1                 2               3               4               5               6               7               8                 9               10               11               12      5 mg   See details      13      7.5 mg         14      5 mg         15      7.5 mg           16      7.5 mg         17      5 mg         18      7.5 mg         19      5 mg         20      7.5 mg         21      5 mg         22      7.5 mg           23      7.5 mg         24      5 mg         25      7.5 mg         26      5 mg         27      7.5 mg         28      5 mg         29      7.5 mg           30      7.5 mg         31      5 mg               Date Details   07/12 This INR check               How to take your warfarin dose     To take:  5 mg Take 1 of the 5 mg  tablets.    To take:  7.5 mg Take 1.5 of the 5 mg tablets.           August 2017 Details    Sun Mon Tue Wed Thu Fri Sat       1      7.5 mg         2      5 mg         3      7.5 mg         4      5 mg         5      7.5 mg           6      7.5 mg         7      5 mg         8      7.5 mg         9      5 mg         10      7.5 mg         11      5 mg         12      7.5 mg           13      7.5 mg         14      5 mg         15      7.5 mg         16      5 mg         17            18               19                 20               21               22               23               24               25               26                 27               28               29               30               31                  Date Details   No additional details    Date of next INR:  8/17/2017         How to take your warfarin dose     To take:  5 mg Take 1 of the 5 mg tablets.    To take:  7.5 mg Take 1.5 of the 5 mg tablets.

## 2017-07-12 NOTE — PROGRESS NOTES
ANTICOAGULATION FOLLOW-UP CLINIC VISIT    Patient Name:  Arslan Clinton  Date:  7/12/2017  Contact Type:  Face to Face    SUBJECTIVE:     Patient Findings     Positives No Problem Findings           OBJECTIVE    INR Protime   Date Value Ref Range Status   07/12/2017 2.9 (A) 0.86 - 1.14 Final       ASSESSMENT / PLAN  INR assessment THER    Recheck INR In: 5 WEEKS    INR Location Clinic      Anticoagulation Summary as of 7/12/2017     INR goal 2.0-3.0   Today's INR 2.9   Maintenance plan 5 mg (5 mg x 1) on Mon, Wed, Fri; 7.5 mg (5 mg x 1.5) all other days   Full instructions 5 mg on Mon, Wed, Fri; 7.5 mg all other days   Weekly total 45 mg   No change documented Tierra Olivarez RN   Plan last modified Tierra Olivarez RN (10/7/2016)   Next INR check 8/17/2017   Target end date     Indications   Pulmonary embolism (H) [I26.99]  Long-term (current) use of anticoagulants [Z79.01] [Z79.01]         Anticoagulation Episode Summary     INR check location     Preferred lab     Send INR reminders to AN ANTICOAG CLINIC    Comments       Anticoagulation Care Providers     Provider Role Specialty Phone number    Cedric Hodgson PA-C [facility] [phone]            See the Encounter Report to view Anticoagulation Flowsheet and Dosing Calendar (Go to Encounters tab in chart review, and find the Anticoagulation Therapy Visit)        Tierra Olivarez RN

## 2017-07-22 ENCOUNTER — TELEPHONE (OUTPATIENT)
Dept: FAMILY MEDICINE | Facility: CLINIC | Age: 52
End: 2017-07-22

## 2017-07-22 NOTE — TELEPHONE ENCOUNTER
7/22/2017      Patient is out of town and will call back when he returns.        Outreach ,  Fortunato Braxton

## 2017-08-17 ENCOUNTER — ANTICOAGULATION THERAPY VISIT (OUTPATIENT)
Dept: NURSING | Facility: CLINIC | Age: 52
End: 2017-08-17
Payer: COMMERCIAL

## 2017-08-17 DIAGNOSIS — I26.99 OTHER PULMONARY EMBOLISM WITHOUT ACUTE COR PULMONALE, UNSPECIFIED CHRONICITY (H): ICD-10-CM

## 2017-08-17 DIAGNOSIS — Z79.01 LONG-TERM (CURRENT) USE OF ANTICOAGULANTS: ICD-10-CM

## 2017-08-17 LAB — INR POINT OF CARE: 1.9 (ref 0.86–1.14)

## 2017-08-17 PROCEDURE — 99207 ZZC NO CHARGE NURSE ONLY: CPT

## 2017-08-17 PROCEDURE — 36416 COLLJ CAPILLARY BLOOD SPEC: CPT

## 2017-08-17 PROCEDURE — 85610 PROTHROMBIN TIME: CPT | Mod: QW

## 2017-08-17 NOTE — PROGRESS NOTES
ANTICOAGULATION FOLLOW-UP CLINIC VISIT    Patient Name:  Arslan Clinton  Date:  8/17/2017  Contact Type:  Face to Face    SUBJECTIVE:     Patient Findings     Positives Change in diet/appetite (eating a  lotmore greens than usual.), No Problem Findings    Comments INR 1.9 due to increase in vit k. He will adjust his diet. Continue same dos.e            OBJECTIVE    INR Protime   Date Value Ref Range Status   08/17/2017 1.9 (A) 0.86 - 1.14 Final       ASSESSMENT / PLAN  INR assessment SUB    Recheck INR In: 5 WEEKS    INR Location Clinic      Anticoagulation Summary as of 8/17/2017     INR goal 2.0-3.0   Today's INR 1.9!   Maintenance plan 5 mg (5 mg x 1) on Mon, Wed, Fri; 7.5 mg (5 mg x 1.5) all other days   Full instructions 5 mg on Mon, Wed, Fri; 7.5 mg all other days   Weekly total 45 mg   No change documented Tierra Olivarez RN   Plan last modified Tierra Olivarez RN (10/7/2016)   Next INR check 9/22/2017   Target end date     Indications   Pulmonary embolism (H) [I26.99]  Long-term (current) use of anticoagulants [Z79.01] [Z79.01]         Anticoagulation Episode Summary     INR check location     Preferred lab     Send INR reminders to AN ANTICOAG CLINIC    Comments       Anticoagulation Care Providers     Provider Role Specialty Phone number    Cedric Hodgson PA-C [facility] [phone]            See the Encounter Report to view Anticoagulation Flowsheet and Dosing Calendar (Go to Encounters tab in chart review, and find the Anticoagulation Therapy Visit)        Tierra Olivarez RN

## 2017-08-17 NOTE — MR AVS SNAPSHOT
Arslan Clinton   8/17/2017 7:15 AM   Anticoagulation Therapy Visit    Description:  52 year old male   Provider:  AN ANTI COAG   Department:  An Nurse           INR as of 8/17/2017     Today's INR 1.9!      Anticoagulation Summary as of 8/17/2017     INR goal 2.0-3.0   Today's INR 1.9!   Full instructions 5 mg on Mon, Wed, Fri; 7.5 mg all other days   Next INR check 9/22/2017    Indications   Pulmonary embolism (H) [I26.99]  Long-term (current) use of anticoagulants [Z79.01] [Z79.01]         Your next Anticoagulation Clinic appointment(s)     Sep 22, 2017  7:15 AM CDT   Anticoagulation Visit with AN ANTI COAG   [facility]    [address] [facility] [address] [phone]              Contact Numbers     [facility]  Please call  [phone] to cancel and/or reschedule your appointment, or with any problems or questions regarding your therapy.        August 2017 Details    Sun Mon Tue Wed u Fri Sat       1               2               3               4               5                 6               7               8               9               10               11               12                 13               14               15               16               17      7.5 mg   See details      18      5 mg         19      7.5 mg           20      7.5 mg         21      5 mg         22      7.5 mg         23      5 mg         24      7.5 mg         25      5 mg         26      7.5 mg           27      7.5 mg         28      5 mg         29      7.5 mg         30      5 mg         31      7.5 mg            Date Details   08/17 This INR check               How to take your warfarin dose     To take:  5 mg Take 1 of the 5 mg tablets.    To take:  7.5 mg Take 1.5 of the 5 mg tablets.           September 2017 Details    Sun Mon Tue Wed Thu Fri Sat          1      5 mg         2      7.5 mg           3      7.5 mg         4      5 mg         5      7.5  mg         6      5 mg         7      7.5 mg         8      5 mg         9      7.5 mg           10      7.5 mg         11      5 mg         12      7.5 mg         13      5 mg         14      7.5 mg         15      5 mg         16      7.5 mg           17      7.5 mg         18      5 mg         19      7.5 mg         20      5 mg         21      7.5 mg         22            23                 24               25               26               27               28               29               30                Date Details   No additional details    Date of next INR:  9/22/2017         How to take your warfarin dose     To take:  5 mg Take 1 of the 5 mg tablets.    To take:  7.5 mg Take 1.5 of the 5 mg tablets.

## 2017-09-29 ENCOUNTER — ANTICOAGULATION THERAPY VISIT (OUTPATIENT)
Dept: NURSING | Facility: CLINIC | Age: 52
End: 2017-09-29
Payer: COMMERCIAL

## 2017-09-29 DIAGNOSIS — Z79.01 LONG-TERM (CURRENT) USE OF ANTICOAGULANTS: ICD-10-CM

## 2017-09-29 DIAGNOSIS — I26.99 OTHER PULMONARY EMBOLISM WITHOUT ACUTE COR PULMONALE, UNSPECIFIED CHRONICITY (H): ICD-10-CM

## 2017-09-29 LAB — INR POINT OF CARE: 1.4 (ref 0.86–1.14)

## 2017-09-29 PROCEDURE — 99207 ZZC NO CHARGE NURSE ONLY: CPT

## 2017-09-29 PROCEDURE — 36416 COLLJ CAPILLARY BLOOD SPEC: CPT

## 2017-09-29 PROCEDURE — 85610 PROTHROMBIN TIME: CPT | Mod: QW

## 2017-09-29 NOTE — MR AVS SNAPSHOT
Arslan Clinton   9/29/2017 7:45 AM   Anticoagulation Therapy Visit    Description:  52 year old male   Provider:  AN ANTI COAG   Department:  An Nurse           INR as of 9/29/2017     Today's INR 1.4!      Anticoagulation Summary as of 9/29/2017     INR goal 2.0-3.0   Today's INR 1.4!   Full instructions 5 mg on Mon, Wed; 7.5 mg all other days   Next INR check 10/16/2017    Indications   Pulmonary embolism (H) [I26.99]  Long-term (current) use of anticoagulants [Z79.01] [Z79.01]         Your next Anticoagulation Clinic appointment(s)     Sep 29, 2017  7:45 AM CDT   Anticoagulation Visit with AN ANTI COAG   [facility]    [address] [facility] [address] [phone]            Oct 16, 2017  7:15 AM CDT   Anticoagulation Visit with AN ANTI COAG   [facility]    [address] [facility] [address] [phone]              Contact Numbers     [facility]  Please call  [phone] to cancel and/or reschedule your appointment, or with any problems or questions regarding your therapy.        September 2017 Details    Sun Mon Tue Wed Thu Fri Sat          1               2                 3               4               5               6               7               8               9                 10               11               12               13               14               15               16                 17               18               19               20               21               22               23                 24               25               26               27               28               29      7.5 mg   See details      30      7.5 mg          Date Details   09/29 This INR check               How to take your warfarin dose     To take:  7.5 mg Take 1.5 of the 5 mg tablets.           October 2017 Details    Sun Mon Tue Wed Thu Fri Sat     1      7.5 mg         2      5 mg          3      7.5 mg         4      5 mg         5      7.5 mg         6      7.5 mg         7      7.5 mg           8      7.5 mg         9      5 mg         10      7.5 mg         11      5 mg         12      7.5 mg         13      7.5 mg         14      7.5 mg           15      7.5 mg         16            17               18               19               20               21                 22               23               24               25               26               27               28                 29               30               31                    Date Details   No additional details    Date of next INR:  10/16/2017         How to take your warfarin dose     To take:  5 mg Take 1 of the 5 mg tablets.    To take:  7.5 mg Take 1.5 of the 5 mg tablets.

## 2017-09-29 NOTE — PROGRESS NOTES
ANTICOAGULATION FOLLOW-UP CLINIC VISIT    Patient Name:  Arslan Clinton  Date:  9/29/2017  Contact Type:  Face to Face    SUBJECTIVE:     Patient Findings     Positives No Problem Findings           OBJECTIVE    INR Protime   Date Value Ref Range Status   09/29/2017 1.4 (A) 0.86 - 1.14 Final       ASSESSMENT / PLAN  INR assessment SUB    Recheck INR In: 2 WEEKS    INR Location Clinic      Anticoagulation Summary as of 9/29/2017     INR goal 2.0-3.0   Today's INR 1.4!   Maintenance plan 5 mg (5 mg x 1) on Mon, Wed; 7.5 mg (5 mg x 1.5) all other days   Full instructions 5 mg on Mon, Wed; 7.5 mg all other days   Weekly total 47.5 mg   Plan last modified Diya Orozco RN (9/29/2017)   Next INR check 10/16/2017   Target end date     Indications   Pulmonary embolism (H) [I26.99]  Long-term (current) use of anticoagulants [Z79.01] [Z79.01]         Anticoagulation Episode Summary     INR check location     Preferred lab     Send INR reminders to AN ANTICOAG CLINIC    Comments       Anticoagulation Care Providers     Provider Role Specialty Phone number    Cedric Hodgson PA-C [facility] [phone]            See the Encounter Report to view Anticoagulation Flowsheet and Dosing Calendar (Go to Encounters tab in chart review, and find the Anticoagulation Therapy Visit)        Diya Orozco RN

## 2017-10-04 DIAGNOSIS — I26.99 PULMONARY EMBOLISM (H): ICD-10-CM

## 2017-10-04 RX ORDER — WARFARIN SODIUM 5 MG/1
TABLET ORAL
Qty: 135 TABLET | Refills: 0 | Status: SHIPPED | OUTPATIENT
Start: 2017-10-04 | End: 2017-12-15

## 2017-10-16 ENCOUNTER — ANTICOAGULATION THERAPY VISIT (OUTPATIENT)
Dept: NURSING | Facility: CLINIC | Age: 52
End: 2017-10-16
Payer: COMMERCIAL

## 2017-10-16 DIAGNOSIS — I26.99 OTHER PULMONARY EMBOLISM WITHOUT ACUTE COR PULMONALE, UNSPECIFIED CHRONICITY (H): ICD-10-CM

## 2017-10-16 DIAGNOSIS — Z79.01 LONG-TERM (CURRENT) USE OF ANTICOAGULANTS: ICD-10-CM

## 2017-10-16 LAB — INR POINT OF CARE: 1.9 (ref 0.86–1.14)

## 2017-10-16 PROCEDURE — 85610 PROTHROMBIN TIME: CPT | Mod: QW

## 2017-10-16 PROCEDURE — 36416 COLLJ CAPILLARY BLOOD SPEC: CPT

## 2017-10-16 PROCEDURE — 99207 ZZC NO CHARGE NURSE ONLY: CPT

## 2017-10-16 NOTE — MR AVS SNAPSHOT
Arslan Clinton   10/16/2017 7:15 AM   Anticoagulation Therapy Visit    Description:  52 year old male   Provider:  AN ANTI COAG   Department:  An Nurse           INR as of 10/16/2017     Today's INR 1.9!      Anticoagulation Summary as of 10/16/2017     INR goal 2.0-3.0   Today's INR 1.9!   Full instructions 5 mg on Mon; 7.5 mg all other days   Next INR check 11/13/2017    Indications   Pulmonary embolism (H) [I26.99]  Long-term (current) use of anticoagulants [Z79.01] [Z79.01]         Your next Anticoagulation Clinic appointment(s)     Nov 13, 2017  7:15 AM CST   Anticoagulation Visit with AN ANTI COAG   [facility]    [address] [facility] [address] [phone]              Contact Numbers     [facility]  Please call  [phone] to cancel and/or reschedule your appointment, or with any problems or questions regarding your therapy.        October 2017 Details    Sun Mon Tue Wed Thu Fri Sat     1               2               3               4               5               6               7                 8               9               10               11               12               13               14                 15               16      5 mg   See details      17      7.5 mg         18      7.5 mg         19      7.5 mg         20      7.5 mg         21      7.5 mg           22      7.5 mg         23      5 mg         24      7.5 mg         25      7.5 mg         26      7.5 mg         27      7.5 mg         28      7.5 mg           29      7.5 mg         30      5 mg         31      7.5 mg              Date Details   10/16 This INR check               How to take your warfarin dose     To take:  5 mg Take 1 of the 5 mg tablets.    To take:  7.5 mg Take 1.5 of the 5 mg tablets.           November 2017 Details    Sun Mon Tue Wed Thu Fri Sat        1      7.5 mg         2      7.5 mg         3      7.5 mg         4      7.5 mg           5       7.5 mg         6      5 mg         7      7.5 mg         8      7.5 mg         9      7.5 mg         10      7.5 mg         11      7.5 mg           12      7.5 mg         13            14               15               16               17               18                 19               20               21               22               23               24               25                 26               27               28               29               30                  Date Details   No additional details    Date of next INR:  11/13/2017         How to take your warfarin dose     To take:  5 mg Take 1 of the 5 mg tablets.    To take:  7.5 mg Take 1.5 of the 5 mg tablets.

## 2017-10-16 NOTE — PROGRESS NOTES
ANTICOAGULATION FOLLOW-UP CLINIC VISIT    Patient Name:  Arslan Clinton  Date:  10/16/2017  Contact Type:  Face to Face    SUBJECTIVE:     Patient Findings     Comments Sub with dose iIncrease. Has cut back on vit k food in past week.  Dose increased again.           OBJECTIVE    INR Protime   Date Value Ref Range Status   10/16/2017 1.9 (A) 0.86 - 1.14 Final       ASSESSMENT / PLAN  INR assessment SUB    Recheck INR In: 4 WEEKS    INR Location Clinic      Anticoagulation Summary as of 10/16/2017     INR goal 2.0-3.0   Today's INR 1.9!   Maintenance plan 5 mg (5 mg x 1) on Mon; 7.5 mg (5 mg x 1.5) all other days   Full instructions 5 mg on Mon; 7.5 mg all other days   Weekly total 50 mg   Plan last modified Tierra Olivarez RN (10/16/2017)   Next INR check 11/13/2017   Target end date     Indications   Pulmonary embolism (H) [I26.99]  Long-term (current) use of anticoagulants [Z79.01] [Z79.01]         Anticoagulation Episode Summary     INR check location     Preferred lab     Send INR reminders to AN ANTICOAG CLINIC    Comments       Anticoagulation Care Providers     Provider Role Specialty Phone number    Cedric Hodgson PA-C [facility] [phone]            See the Encounter Report to view Anticoagulation Flowsheet and Dosing Calendar (Go to Encounters tab in chart review, and find the Anticoagulation Therapy Visit)        Tierra Olivarez RN

## 2017-11-13 ENCOUNTER — ANTICOAGULATION THERAPY VISIT (OUTPATIENT)
Dept: NURSING | Facility: CLINIC | Age: 52
End: 2017-11-13
Payer: COMMERCIAL

## 2017-11-13 DIAGNOSIS — Z79.01 LONG-TERM (CURRENT) USE OF ANTICOAGULANTS: ICD-10-CM

## 2017-11-13 DIAGNOSIS — I26.99 OTHER PULMONARY EMBOLISM WITHOUT ACUTE COR PULMONALE, UNSPECIFIED CHRONICITY (H): ICD-10-CM

## 2017-11-13 LAB — INR POINT OF CARE: 2.8 (ref 0.86–1.14)

## 2017-11-13 PROCEDURE — 36416 COLLJ CAPILLARY BLOOD SPEC: CPT

## 2017-11-13 PROCEDURE — 99207 ZZC NO CHARGE NURSE ONLY: CPT

## 2017-11-13 PROCEDURE — 85610 PROTHROMBIN TIME: CPT | Mod: QW

## 2017-11-13 NOTE — PROGRESS NOTES
ANTICOAGULATION FOLLOW-UP CLINIC VISIT    Patient Name:  Arslan Clinton  Date:  11/13/2017  Contact Type:  Face to Face    SUBJECTIVE:     Patient Findings     Positives No Problem Findings           OBJECTIVE    INR Protime   Date Value Ref Range Status   11/13/2017 2.8 (A) 0.86 - 1.14 Final       ASSESSMENT / PLAN  INR assessment THER    Recheck INR In: 4 WEEKS    INR Location Clinic      Anticoagulation Summary as of 11/13/2017     INR goal 2.0-3.0   Today's INR 2.8   Maintenance plan 5 mg (5 mg x 1) on Mon; 7.5 mg (5 mg x 1.5) all other days   Full instructions 5 mg on Mon; 7.5 mg all other days   Weekly total 50 mg   No change documented Tierra Olivarez RN   Plan last modified Tierra Olivarez RN (10/16/2017)   Next INR check 12/15/2017   Target end date     Indications   Pulmonary embolism (H) [I26.99]  Long-term (current) use of anticoagulants [Z79.01] [Z79.01]         Anticoagulation Episode Summary     INR check location     Preferred lab     Send INR reminders to AN ANTICOAG CLINIC    Comments       Anticoagulation Care Providers     Provider Role Specialty Phone number    Cedric Hodgson PA-C [facility] [phone]            See the Encounter Report to view Anticoagulation Flowsheet and Dosing Calendar (Go to Encounters tab in chart review, and find the Anticoagulation Therapy Visit)        Tierra Olivarez RN

## 2017-11-13 NOTE — MR AVS SNAPSHOT
Arslan Clinton   11/13/2017 7:15 AM   Anticoagulation Therapy Visit    Description:  52 year old male   Provider:  AN ANTI COAG   Department:  An Nurse           INR as of 11/13/2017     Today's INR 2.8      Anticoagulation Summary as of 11/13/2017     INR goal 2.0-3.0   Today's INR 2.8   Full instructions 5 mg on Mon; 7.5 mg all other days   Next INR check 12/15/2017    Indications   Pulmonary embolism (H) [I26.99]  Long-term (current) use of anticoagulants [Z79.01] [Z79.01]         Your next Anticoagulation Clinic appointment(s)     Dec 15, 2017  7:15 AM CST   [facility] AN ANTI [facility]AG   [facility]    [address] [facility] [address] [phone]              Contact Numbers     [facility]  Please call  [phone] to cancel and/or reschedule your appointment, or with any problems or questions regarding your therapy.        November 2017 Details    Sun Mon Tue Wed [facility] Sat        1               2               3               4                 5               6               7               8               9               10               11                 12               13      5 mg   See details      14      7.5 mg         15      7.5 mg         16      7.5 mg         17      7.5 mg         18      7.5 mg           19      7.5 mg         20      5 mg         21      7.5 mg         22      7.5 mg         23      7.5 mg         24      7.5 mg         25      7.5 mg           26      7.5 mg         27      5 mg         28      7.5 mg         29      7.5 mg         30      7.5 mg            Date Details   11/13 This INR check               How to take your warfarin dose     To take:  5 mg Take 1 of the 5 mg tablets.    To take:  7.5 mg Take 1.5 of the 5 mg tablets.           December 2017 Details    Sun Mon Tue Wed Thu Fri Sat          1      7.5 mg         2      7.5 mg           3      7.5 mg         4      5 mg         5       7.5 mg         6      7.5 mg         7      7.5 mg         8      7.5 mg         9      7.5 mg           10      7.5 mg         11      5 mg         12      7.5 mg         13      7.5 mg         14      7.5 mg         15            16                 17               18               19               20               21               22               23                 24               25               26               27               28               29               30                 31                      Date Details   No additional details    Date of next INR:  12/15/2017         How to take your warfarin dose     To take:  5 mg Take 1 of the 5 mg tablets.    To take:  7.5 mg Take 1.5 of the 5 mg tablets.

## 2017-12-15 ENCOUNTER — ANTICOAGULATION THERAPY VISIT (OUTPATIENT)
Dept: NURSING | Facility: CLINIC | Age: 52
End: 2017-12-15
Payer: COMMERCIAL

## 2017-12-15 DIAGNOSIS — I26.99 PULMONARY EMBOLISM (H): ICD-10-CM

## 2017-12-15 DIAGNOSIS — I26.99 OTHER PULMONARY EMBOLISM WITHOUT ACUTE COR PULMONALE, UNSPECIFIED CHRONICITY (H): ICD-10-CM

## 2017-12-15 DIAGNOSIS — Z79.01 LONG-TERM (CURRENT) USE OF ANTICOAGULANTS: ICD-10-CM

## 2017-12-15 LAB — INR POINT OF CARE: 2.8 (ref 0.86–1.14)

## 2017-12-15 PROCEDURE — 99207 ZZC NO CHARGE NURSE ONLY: CPT

## 2017-12-15 PROCEDURE — 85610 PROTHROMBIN TIME: CPT | Mod: QW

## 2017-12-15 PROCEDURE — 36416 COLLJ CAPILLARY BLOOD SPEC: CPT

## 2017-12-15 RX ORDER — WARFARIN SODIUM 5 MG/1
TABLET ORAL
Qty: 135 TABLET | Refills: 1 | Status: SHIPPED | OUTPATIENT
Start: 2017-12-15 | End: 2018-07-05

## 2017-12-15 NOTE — MR AVS SNAPSHOT
Arslan Clinton   12/15/2017 7:15 AM   Anticoagulation Therapy Visit    Description:  52 year old male   Provider:  AN ANTI COAG   Department:  An Nurse           INR as of 12/15/2017     Today's INR 2.8      Anticoagulation Summary as of 12/15/2017     INR goal 2.0-3.0   Today's INR 2.8   Full instructions 5 mg on Mon; 7.5 mg all other days   Next INR check 1/12/2018    Indications   Pulmonary embolism (H) [I26.99]  Long-term (current) use of anticoagulants [Z79.01] [Z79.01]         Your next Anticoagulation Clinic appointment(s)     Jan 12, 2018  7:15 AM CST   Anticoagulation Visit with AN ANTI COAG   [facility]    [address] [facility] [address] [phone]              Contact Numbers     [facility]  Please call  [phone] to cancel and/or reschedule your appointment, or with any problems or questions regarding your therapy.        December 2017 Details    Sun Mon Tue [facility] Sat          1               2                 3               4               5               6               7               8               9                 10               11               12               13               14               15      7.5 mg   See details      16      7.5 mg           17      7.5 mg         18      5 mg         19      7.5 mg         20      7.5 mg         21      7.5 mg         22      7.5 mg         23      7.5 mg           24      7.5 mg         25      5 mg         26      7.5 mg         27      7.5 mg         28      7.5 mg         29      7.5 mg         30      7.5 mg           31      7.5 mg                Date Details   12/15 This INR check               How to take your warfarin dose     To take:  5 mg Take 1 of the 5 mg tablets.    To take:  7.5 mg Take 1.5 of the 5 mg tablets.           January 2018 Details    Sun Mon Tue [facility] Fri Sat      1      5 mg         2      7.5 mg         3      7.5 mg         4      7.5 mg          5      7.5 mg         6      7.5 mg           7      7.5 mg         8      5 mg         9      7.5 mg         10      7.5 mg         11      7.5 mg         12            13                 14               15               16               17               18               19               20                 21               22               23               24               25               26               27                 28               29               30               31                   Date Details   No additional details    Date of next INR:  1/12/2018         How to take your warfarin dose     To take:  5 mg Take 1 of the 5 mg tablets.    To take:  7.5 mg Take 1.5 of the 5 mg tablets.

## 2017-12-15 NOTE — PROGRESS NOTES
ANTICOAGULATION FOLLOW-UP CLINIC VISIT    Patient Name:  Arslan Clinton  Date:  12/15/2017  Contact Type:  Face to Face    SUBJECTIVE:     Patient Findings     Positives No Problem Findings           OBJECTIVE    INR Protime   Date Value Ref Range Status   12/15/2017 2.8 (A) 0.86 - 1.14 Final       ASSESSMENT / PLAN  INR assessment THER    Recheck INR In: 4 WEEKS    INR Location Clinic      Anticoagulation Summary as of 12/15/2017     INR goal 2.0-3.0   Today's INR 2.8   Maintenance plan 5 mg (5 mg x 1) on Mon; 7.5 mg (5 mg x 1.5) all other days   Full instructions 5 mg on Mon; 7.5 mg all other days   Weekly total 50 mg   No change documented Tierra Olivarez RN   Plan last modified Tierra Olivarez RN (10/16/2017)   Next INR check 1/12/2018   Target end date     Indications   Pulmonary embolism (H) [I26.99]  Long-term (current) use of anticoagulants [Z79.01] [Z79.01]         Anticoagulation Episode Summary     INR check location     Preferred lab     Send INR reminders to AN ANTICOAG CLINIC    Comments       Anticoagulation Care Providers     Provider Role Specialty Phone number    Cedric Hodgson PA-C [facility] [phone]            See the Encounter Report to view Anticoagulation Flowsheet and Dosing Calendar (Go to Encounters tab in chart review, and find the Anticoagulation Therapy Visit)        Tierra Olivarez RN

## 2018-01-05 DIAGNOSIS — I26.99 PULMONARY EMBOLISM (H): ICD-10-CM

## 2018-01-05 RX ORDER — WARFARIN SODIUM 5 MG/1
TABLET ORAL
Qty: 135 TABLET | Refills: 0 | OUTPATIENT
Start: 2018-01-05

## 2018-01-12 ENCOUNTER — ANTICOAGULATION THERAPY VISIT (OUTPATIENT)
Dept: NURSING | Facility: CLINIC | Age: 53
End: 2018-01-12
Payer: COMMERCIAL

## 2018-01-12 DIAGNOSIS — Z79.01 LONG-TERM (CURRENT) USE OF ANTICOAGULANTS: ICD-10-CM

## 2018-01-12 DIAGNOSIS — I26.99 OTHER PULMONARY EMBOLISM WITHOUT ACUTE COR PULMONALE, UNSPECIFIED CHRONICITY (H): ICD-10-CM

## 2018-01-12 LAB — INR POINT OF CARE: 2 (ref 0.86–1.14)

## 2018-01-12 PROCEDURE — 85610 PROTHROMBIN TIME: CPT | Mod: QW

## 2018-01-12 PROCEDURE — 99207 ZZC NO CHARGE NURSE ONLY: CPT

## 2018-01-12 PROCEDURE — 36416 COLLJ CAPILLARY BLOOD SPEC: CPT

## 2018-01-12 NOTE — MR AVS SNAPSHOT
Arslan Clinton   1/12/2018 7:15 AM   Anticoagulation Therapy Visit    Description:  53 year old male   Provider:  AN ANTI COAG   Department:  An Nurse           INR as of 1/12/2018     Today's INR 2.0      Anticoagulation Summary as of 1/12/2018     INR goal 2.0-3.0   Today's INR 2.0   Full instructions 5 mg on Mon; 7.5 mg all other days   Next INR check 2/16/2018    Indications   Pulmonary embolism (H) [I26.99]  Long-term (current) use of anticoagulants [Z79.01] [Z79.01]         Your next Anticoagulation Clinic appointment(s)     Feb 16, 2018  7:15 AM CST   Anticoagulation Visit with AN ANTI COAG   [facility]    [address] [facility] [address] [phone]              Contact Numbers     [facility]  Please call  [phone] to cancel and/or reschedule your appointment, or with any problems or questions regarding your therapy.        January 2018 Details    Sun Mon Tue Wed u Fri Sat      1               2               3               4               5               6                 7               8               9               10               11               12      7.5 mg   See details      13      7.5 mg           14      7.5 mg         15      5 mg         16      7.5 mg         17      7.5 mg         18      7.5 mg         19      7.5 mg         20      7.5 mg           21      7.5 mg         22      5 mg         23      7.5 mg         24      7.5 mg         25      7.5 mg         26      7.5 mg         27      7.5 mg           28      7.5 mg         29      5 mg         30      7.5 mg         31      7.5 mg             Date Details   01/12 This INR check               How to take your warfarin dose     To take:  5 mg Take 1 of the 5 mg tablets.    To take:  7.5 mg Take 1.5 of the 5 mg tablets.           February 2018 Details    Sun Mon Tue Wed Thu Fri Sat         1      7.5 mg         2      7.5 mg         3      7.5 mg           4       7.5 mg         5      5 mg         6      7.5 mg         7      7.5 mg         8      7.5 mg         9      7.5 mg         10      7.5 mg           11      7.5 mg         12      5 mg         13      7.5 mg         14      7.5 mg         15      7.5 mg         16            17                 18               19               20               21               22               23               24                 25               26               27               28                   Date Details   No additional details    Date of next INR:  2/16/2018         How to take your warfarin dose     To take:  5 mg Take 1 of the 5 mg tablets.    To take:  7.5 mg Take 1.5 of the 5 mg tablets.

## 2018-01-12 NOTE — PROGRESS NOTES
ANTICOAGULATION FOLLOW-UP CLINIC VISIT    Patient Name:  Arslan Clinton  Date:  1/12/2018  Contact Type:  Face to Face    SUBJECTIVE:     Patient Findings     Comments Therapeutic. Having right back flank pain for past week. No history of kidney stones. No fever or chills. Advised he be seen and he agrees and will see how the day goes and go to ED if pain persists. Continue same warfarin .           OBJECTIVE    INR Protime   Date Value Ref Range Status   01/12/2018 2.0 (A) 0.86 - 1.14 Final       ASSESSMENT / PLAN  INR assessment THER    Recheck INR In: 5 WEEKS    INR Location Clinic      Anticoagulation Summary as of 1/12/2018     INR goal 2.0-3.0   Today's INR 2.0   Maintenance plan 5 mg (5 mg x 1) on Mon; 7.5 mg (5 mg x 1.5) all other days   Full instructions 5 mg on Mon; 7.5 mg all other days   Weekly total 50 mg   No change documented Tierra Olivarez RN   Plan last modified Tierra Olivarez RN (10/16/2017)   Next INR check 2/16/2018   Target end date     Indications   Pulmonary embolism (H) [I26.99]  Long-term (current) use of anticoagulants [Z79.01] [Z79.01]         Anticoagulation Episode Summary     INR check location     Preferred lab     Send INR reminders to AN ANTICOAG CLINIC    Comments       Anticoagulation Care Providers     Provider Role Specialty Phone number    Cedric Hodgson PA-C [facility] [phone]            See the Encounter Report to view Anticoagulation Flowsheet and Dosing Calendar (Go to Encounters tab in chart review, and find the Anticoagulation Therapy Visit)        Tierra Olivarez RN

## 2018-02-16 ENCOUNTER — ANTICOAGULATION THERAPY VISIT (OUTPATIENT)
Dept: NURSING | Facility: CLINIC | Age: 53
End: 2018-02-16
Payer: COMMERCIAL

## 2018-02-16 DIAGNOSIS — I26.99 OTHER PULMONARY EMBOLISM WITHOUT ACUTE COR PULMONALE, UNSPECIFIED CHRONICITY (H): ICD-10-CM

## 2018-02-16 DIAGNOSIS — Z79.01 LONG-TERM (CURRENT) USE OF ANTICOAGULANTS: ICD-10-CM

## 2018-02-16 LAB — INR POINT OF CARE: 2.6 (ref 0.86–1.14)

## 2018-02-16 PROCEDURE — 36416 COLLJ CAPILLARY BLOOD SPEC: CPT

## 2018-02-16 PROCEDURE — 85610 PROTHROMBIN TIME: CPT | Mod: QW

## 2018-02-16 PROCEDURE — 99207 ZZC NO CHARGE NURSE ONLY: CPT

## 2018-02-16 NOTE — PROGRESS NOTES
ANTICOAGULATION FOLLOW-UP CLINIC VISIT    Patient Name:  Arslan Clinton  Date:  2/16/2018  Contact Type:  Face to Face    SUBJECTIVE:     Patient Findings     Positives No Problem Findings           OBJECTIVE    INR Protime   Date Value Ref Range Status   02/16/2018 2.6 (A) 0.86 - 1.14 Final       ASSESSMENT / PLAN  INR assessment THER    Recheck INR In: 5 WEEKS    INR Location Clinic      Anticoagulation Summary as of 2/16/2018     INR goal 2.0-3.0   Today's INR 2.6   Maintenance plan 5 mg (5 mg x 1) on Mon; 7.5 mg (5 mg x 1.5) all other days   Full instructions 5 mg on Mon; 7.5 mg all other days   Weekly total 50 mg   No change documented Tierra Olivarez RN   Plan last modified Tierra Olivarez RN (10/16/2017)   Next INR check 3/23/2018   Target end date     Indications   Pulmonary embolism (H) [I26.99]  Long-term (current) use of anticoagulants [Z79.01] [Z79.01]         Anticoagulation Episode Summary     INR check location     Preferred lab     Send INR reminders to AN ANTICOAG CLINIC    Comments       Anticoagulation Care Providers     Provider Role Specialty Phone number    Cedric Hodgson PA-C [facility] [phone]            See the Encounter Report to view Anticoagulation Flowsheet and Dosing Calendar (Go to Encounters tab in chart review, and find the Anticoagulation Therapy Visit)        Tierra Olivarez RN

## 2018-02-16 NOTE — MR AVS SNAPSHOT
Arslan Clinton   2/16/2018 7:15 AM   Anticoagulation Therapy Visit    Description:  53 year old male   Provider:  AN ANTI COAG   Department:  An Nurse           INR as of 2/16/2018     Today's INR 2.6      Anticoagulation Summary as of 2/16/2018     INR goal 2.0-3.0   Today's INR 2.6   Full instructions 5 mg on Mon; 7.5 mg all other days   Next INR check 3/23/2018    Indications   Pulmonary embolism (H) [I26.99]  Long-term (current) use of anticoagulants [Z79.01] [Z79.01]         Your next Anticoagulation Clinic appointment(s)     Mar 23, 2018  7:15 AM CDT   An[facility]agulation Visit with AN ANTI COAG   [facility]    [address] [facility] [address] [phone]              Contact Numbers     [facility]  Please call  [phone] to cancel and/or reschedule your appointment, or with any problems or questions regarding your therapy.        February 2018 Details    Sun Mon Tue Wed Thu Fri Sat         1               2               3                 4               5               6               7               8               9               10                 11               12               13               14               15               16      7.5 mg   See details      17      7.5 mg           18      7.5 mg         19      5 mg         20      7.5 mg         21      7.5 mg         22      7.5 mg         23      7.5 mg         24      7.5 mg           25      7.5 mg         26      5 mg         27      7.5 mg         28      7.5 mg             Date Details   02/16 This INR check               How to take your warfarin dose     To take:  5 mg Take 1 of the 5 mg tablets.    To take:  7.5 mg Take 1.5 of the 5 mg tablets.           March 2018 Details    Sun Mon Tue Wed Thu Fri Sat         1      7.5 mg         2      7.5 mg         3      7.5 mg           4      7.5 mg         5      5 mg         6      7.5 mg         7      7.5 mg         8      7.5  mg         9      7.5 mg         10      7.5 mg           11      7.5 mg         12      5 mg         13      7.5 mg         14      7.5 mg         15      7.5 mg         16      7.5 mg         17      7.5 mg           18      7.5 mg         19      5 mg         20      7.5 mg         21      7.5 mg         22      7.5 mg         23            24                 25               26               27               28               29               30               31                Date Details   No additional details    Date of next INR:  3/23/2018         How to take your warfarin dose     To take:  5 mg Take 1 of the 5 mg tablets.    To take:  7.5 mg Take 1.5 of the 5 mg tablets.

## 2018-03-23 ENCOUNTER — ANTICOAGULATION THERAPY VISIT (OUTPATIENT)
Dept: NURSING | Facility: CLINIC | Age: 53
End: 2018-03-23
Payer: COMMERCIAL

## 2018-03-23 DIAGNOSIS — Z79.01 LONG-TERM (CURRENT) USE OF ANTICOAGULANTS: ICD-10-CM

## 2018-03-23 DIAGNOSIS — I26.99 OTHER PULMONARY EMBOLISM WITHOUT ACUTE COR PULMONALE, UNSPECIFIED CHRONICITY (H): ICD-10-CM

## 2018-03-23 LAB — INR POINT OF CARE: 2.4 (ref 0.86–1.14)

## 2018-03-23 PROCEDURE — 99207 ZZC NO CHARGE NURSE ONLY: CPT

## 2018-03-23 PROCEDURE — 85610 PROTHROMBIN TIME: CPT | Mod: QW

## 2018-03-23 PROCEDURE — 36416 COLLJ CAPILLARY BLOOD SPEC: CPT

## 2018-03-23 NOTE — MR AVS SNAPSHOT
Arslan Clinton   3/23/2018 7:15 AM   Anticoagulation Therapy Visit    Description:  53 year old male   Provider:  AN ANTI COAG   Department:  An Nurse           INR as of 3/23/2018     Today's INR 2.4      Anticoagulation Summary as of 3/23/2018     INR goal 2.0-3.0   Today's INR 2.4   Full instructions 5 mg on Mon; 7.5 mg all other days   Next INR check 4/27/2018    Indications   Pulmonary embolism (H) [I26.99]  Long-term (current) use of anticoagulants [Z79.01] [Z79.01]         Your next Anticoagulation Clinic appointment(s)     Apr 27, 2018  7:15 AM CDT   Anticoagulation Visit with AN ANTI COAG   [facility]    [address] [facility] [address] [phone]              Contact Numbers     [facility]  Please call  [phone] to cancel and/or reschedule your appointment, or with any problems or questions regarding your therapy.        March 2018 Details    Sun Mon Tue Wed Thu Fri Sat         1               2               3                 4               5               6               7               8               9               10                 11               12               13               14               15               16               17                 18               19               20               21               22               23      7.5 mg   See details      24      7.5 mg           25      7.5 mg         26      5 mg         27      7.5 mg         28      7.5 mg         29      7.5 mg         30      7.5 mg         31      7.5 mg          Date Details   03/23 This INR check               How to take your warfarin dose     To take:  5 mg Take 1 of the 5 mg tablets.    To take:  7.5 mg Take 1.5 of the 5 mg tablets.           April 2018 Details    Sun Mon Tue Wed Thu Fri Sat     1      7.5 mg         2      5 mg         3      7.5 mg         4      7.5 mg         5      7.5 mg         6      7.5 mg         7      7.5 mg            8      7.5 mg         9      5 mg         10      7.5 mg         11      7.5 mg         12      7.5 mg         13      7.5 mg         14      7.5 mg           15      7.5 mg         16      5 mg         17      7.5 mg         18      7.5 mg         19      7.5 mg         20      7.5 mg         21      7.5 mg           22      7.5 mg         23      5 mg         24      7.5 mg         25      7.5 mg         26      7.5 mg         27            28                 29               30                     Date Details   No additional details    Date of next INR:  4/27/2018         How to take your warfarin dose     To take:  5 mg Take 1 of the 5 mg tablets.    To take:  7.5 mg Take 1.5 of the 5 mg tablets.

## 2018-03-23 NOTE — PROGRESS NOTES
ANTICOAGULATION FOLLOW-UP CLINIC VISIT    Patient Name:  Arslan Clinton  Date:  3/23/2018  Contact Type:  Face to Face    SUBJECTIVE:     Patient Findings     Positives No Problem Findings           OBJECTIVE    INR Protime   Date Value Ref Range Status   03/23/2018 2.4 (A) 0.86 - 1.14 Final       ASSESSMENT / PLAN  INR assessment THER    Recheck INR In: 5 WEEKS    INR Location Clinic      Anticoagulation Summary as of 3/23/2018     INR goal 2.0-3.0   Today's INR 2.4   Maintenance plan 5 mg (5 mg x 1) on Mon; 7.5 mg (5 mg x 1.5) all other days   Full instructions 5 mg on Mon; 7.5 mg all other days   Weekly total 50 mg   No change documented Tierra Olivarez RN   Plan last modified Tierra Olivarez RN (10/16/2017)   Next INR check 4/27/2018   Target end date     Indications   Pulmonary embolism (H) [I26.99]  Long-term (current) use of anticoagulants [Z79.01] [Z79.01]         Anticoagulation Episode Summary     INR check location     Preferred lab     Send INR reminders to AN ANTICOAG CLINIC    Comments       Anticoagulation Care Providers     Provider Role Specialty Phone number    Cedric Hodgson PA-C [facility] [phone]            See the Encounter Report to view Anticoagulation Flowsheet and Dosing Calendar (Go to Encounters tab in chart review, and find the Anticoagulation Therapy Visit)    Dosage adjustment made based on physician directed care plan.    Tierra Olivarez RN

## 2018-04-27 ENCOUNTER — ANTICOAGULATION THERAPY VISIT (OUTPATIENT)
Dept: NURSING | Facility: CLINIC | Age: 53
End: 2018-04-27
Payer: COMMERCIAL

## 2018-04-27 DIAGNOSIS — Z79.01 LONG-TERM (CURRENT) USE OF ANTICOAGULANTS: ICD-10-CM

## 2018-04-27 DIAGNOSIS — I26.99 OTHER PULMONARY EMBOLISM WITHOUT ACUTE COR PULMONALE, UNSPECIFIED CHRONICITY (H): ICD-10-CM

## 2018-04-27 LAB — INR POINT OF CARE: 2 (ref 0.86–1.14)

## 2018-04-27 PROCEDURE — 36416 COLLJ CAPILLARY BLOOD SPEC: CPT

## 2018-04-27 PROCEDURE — 85610 PROTHROMBIN TIME: CPT | Mod: QW

## 2018-04-27 PROCEDURE — 99207 ZZC NO CHARGE NURSE ONLY: CPT

## 2018-04-27 NOTE — PROGRESS NOTES
ANTICOAGULATION FOLLOW-UP CLINIC VISIT    Patient Name:  Arslan Clinton  Date:  4/27/2018  Contact Type:  Face to Face    SUBJECTIVE:     Patient Findings     Positives No Problem Findings           OBJECTIVE    INR Protime   Date Value Ref Range Status   04/27/2018 2.0 (A) 0.86 - 1.14 Final       ASSESSMENT / PLAN  INR assessment THER    Recheck INR In: 6 WEEKS    INR Location Clinic      Anticoagulation Summary as of 4/27/2018     INR goal 2.0-3.0   Today's INR 2.0   Maintenance plan 5 mg (5 mg x 1) on Mon; 7.5 mg (5 mg x 1.5) all other days   Full instructions 5 mg on Mon; 7.5 mg all other days   Weekly total 50 mg   No change documented Tierra Olivarez RN   Plan last modified Tierra Olivarez RN (10/16/2017)   Next INR check 6/8/2018   Target end date     Indications   Pulmonary embolism (H) [I26.99]  Long-term (current) use of anticoagulants [Z79.01] [Z79.01]         Anticoagulation Episode Summary     INR check location     Preferred lab     Send INR reminders to AN ANTICOAG CLINIC    Comments       Anticoagulation Care Providers     Provider Role Specialty Phone number    Cedric Hodgson PA-C [facility] [phone]            See the Encounter Report to view Anticoagulation Flowsheet and Dosing Calendar (Go to Encounters tab in chart review, and find the Anticoagulation Therapy Visit)    Dosage adjustment made based on physician directed care plan.    Tierra Olivarez RN

## 2018-04-27 NOTE — MR AVS SNAPSHOT
Arslan Clinton   4/27/2018 7:15 AM   Anticoagulation Therapy Visit    Description:  53 year old male   Provider:  AN ANTI COAG   Department:  An Nurse           INR as of 4/27/2018     Today's INR 2.0      Anticoagulation Summary as of 4/27/2018     INR goal 2.0-3.0   Today's INR 2.0   Full instructions 5 mg on Mon; 7.5 mg all other days   Next INR check 6/8/2018    Indications   Pulmonary embolism (H) [I26.99]  Long-term (current) use of anticoagulants [Z79.01] [Z79.01]         Your next Anticoagulation Clinic appointment(s)     Jun 08, 2018  7:15 AM CDT   [facility] AN [facility]   [facility]    [address] [place] [address] [phone]              Contact Numbers     [facility]  Please call  [phone] to cancel and/or reschedule your appointment, or with any problems or questions regarding your therapy.        April 2018 Details    Sun Mon Tue Wed Thu Fri Sat     1               2               3               4               5               6               7                 8               9               10               11               12               13               14                 15               16               17               18               19               20               21                 22               23               24               25               26               27      7.5 mg   See details      28      7.5 mg           29      7.5 mg         30      5 mg               Date Details   04/27 This INR check               How to take your warfarin dose     To take:  5 mg Take 1 of the 5 mg tablets.    To take:  7.5 mg Take 1.5 of the 5 mg tablets.           May 2018 Details    Sun Mon Tue Wed Thu Fri Sat       1      7.5 mg         2      7.5 mg         3      7.5 mg         4      7.5 mg         5      7.5 mg           6      7.5 mg         7      5 mg         8      7.5 mg         9      7.5 mg          10      7.5 mg         11      7.5 mg         12      7.5 mg           13      7.5 mg         14      5 mg         15      7.5 mg         16      7.5 mg         17      7.5 mg         18      7.5 mg         19      7.5 mg           20      7.5 mg         21      5 mg         22      7.5 mg         23      7.5 mg         24      7.5 mg         25      7.5 mg         26      7.5 mg           27      7.5 mg         28      5 mg         29      7.5 mg         30      7.5 mg         31      7.5 mg            Date Details   No additional details            How to take your warfarin dose     To take:  5 mg Take 1 of the 5 mg tablets.    To take:  7.5 mg Take 1.5 of the 5 mg tablets.           June 2018 Details    Sun Mon Tue Wed Thu Fri Sat          1      7.5 mg         2      7.5 mg           3      7.5 mg         4      5 mg         5      7.5 mg         6      7.5 mg         7      7.5 mg         8            9                 10               11               12               13               14               15               16                 17               18               19               20               21               22               23                 24               25               26               27               28               29               30                Date Details   No additional details    Date of next INR:  6/8/2018         How to take your warfarin dose     To take:  5 mg Take 1 of the 5 mg tablets.    To take:  7.5 mg Take 1.5 of the 5 mg tablets.

## 2018-06-08 ENCOUNTER — TELEPHONE (OUTPATIENT)
Dept: FAMILY MEDICINE | Facility: CLINIC | Age: 53
End: 2018-06-08

## 2018-06-08 ENCOUNTER — ANTICOAGULATION THERAPY VISIT (OUTPATIENT)
Dept: NURSING | Facility: CLINIC | Age: 53
End: 2018-06-08
Payer: COMMERCIAL

## 2018-06-08 DIAGNOSIS — Z86.718 PERSONAL HISTORY OF VENOUS THROMBOSIS AND EMBOLISM: ICD-10-CM

## 2018-06-08 DIAGNOSIS — Z79.01 LONG-TERM (CURRENT) USE OF ANTICOAGULANTS: ICD-10-CM

## 2018-06-08 DIAGNOSIS — I26.99 OTHER PULMONARY EMBOLISM WITHOUT ACUTE COR PULMONALE, UNSPECIFIED CHRONICITY (H): Primary | ICD-10-CM

## 2018-06-08 DIAGNOSIS — I26.99 OTHER PULMONARY EMBOLISM WITHOUT ACUTE COR PULMONALE, UNSPECIFIED CHRONICITY (H): ICD-10-CM

## 2018-06-08 LAB — INR POINT OF CARE: 2.7 (ref 0.86–1.14)

## 2018-06-08 PROCEDURE — 85610 PROTHROMBIN TIME: CPT | Mod: QW

## 2018-06-08 PROCEDURE — 99207 ZZC NO CHARGE NURSE ONLY: CPT

## 2018-06-08 PROCEDURE — 36416 COLLJ CAPILLARY BLOOD SPEC: CPT

## 2018-06-08 NOTE — PROGRESS NOTES
ANTICOAGULATION FOLLOW-UP CLINIC VISIT    Patient Name:  Arslan Clinton  Date:  6/8/2018  Contact Type:  Face to Face    SUBJECTIVE:     Patient Findings     Positives No Problem Findings           OBJECTIVE    INR Protime   Date Value Ref Range Status   06/08/2018 2.7 (A) 0.86 - 1.14 Final       ASSESSMENT / PLAN  INR assessment THER    Recheck INR In: 5 WEEKS    INR Location Clinic      Anticoagulation Summary as of 6/8/2018     INR goal 2.0-3.0   Today's INR 2.7   Warfarin maintenance plan 5 mg (5 mg x 1) on Mon; 7.5 mg (5 mg x 1.5) all other days   Full warfarin instructions 5 mg on Mon; 7.5 mg all other days   Weekly warfarin total 50 mg   No change documented Tierra Olivarez RN   Plan last modified Tierra Olivarez RN (10/16/2017)   Next INR check 7/12/2018   Target end date     Indications   Pulmonary embolism (H) [I26.99]  Long-term (current) use of anticoagulants [Z79.01] [Z79.01]         Anticoagulation Episode Summary     INR check location     Preferred lab     Send INR reminders to AN ANTICOAG CLINIC    Comments       Anticoagulation Care Providers     Provider Role Specialty Phone number    Cedric Hodgson PA-C [facility] [phone]            See the Encounter Report to view Anticoagulation Flowsheet and Dosing Calendar (Go to Encounters tab in chart review, and find the Anticoagulation Therapy Visit)        Tierra Olivarez RN

## 2018-06-08 NOTE — MR AVS SNAPSHOT
Arslan Clinton   6/8/2018 7:15 AM   Anticoagulation Therapy Visit    Description:  53 year old male   Provider:  AN ANTI COAG   Department:  An Nurse           INR as of 6/8/2018     Today's INR 2.7      Anticoagulation Summary as of 6/8/2018     INR goal 2.0-3.0   Today's INR 2.7   Full warfarin instructions 5 mg on Mon; 7.5 mg all other days   Next INR check 7/12/2018    Indications   Pulmonary embolism (H) [I26.99]  Long-term (current) use of anticoagulants [Z79.01] [Z79.01]         Your next Anticoagulation Clinic appointment(s)     Jul 12, 2018  7:15 AM CDT   [facility]   [facility]    [address] [facility] [address] [phone]              Contact Numbers     [facility]  Please call  [phone] to cancel and/or reschedule your appointment, or with any problems or questions regarding your therapy.        June 2018 Details    Sun Mon Tue Wed Thu Fri Sat          1               2                 3               4               5               6               7               8      7.5 mg   See details      9      7.5 mg           10      7.5 mg         11      5 mg         12      7.5 mg         13      7.5 mg         14      7.5 mg         15      7.5 mg         16      7.5 mg           17      7.5 mg         18      5 mg         19      7.5 mg         20      7.5 mg         21      7.5 mg         22      7.5 mg         23      7.5 mg           24      7.5 mg         25      5 mg         26      7.5 mg         27      7.5 mg         28      7.5 mg         29      7.5 mg         30      7.5 mg          Date Details   06/08 This INR check               How to take your warfarin dose     To take:  5 mg Take 1 of the 5 mg tablets.    To take:  7.5 mg Take 1.5 of the 5 mg tablets.           July 2018 Details    Sun Mon Tue Wed Thu Fri Sat     1      7.5 mg         2      5 mg         3      7.5 mg         4      7.5 mg          5      7.5 mg         6      7.5 mg         7      7.5 mg           8      7.5 mg         9      5 mg         10      7.5 mg         11      7.5 mg         12            13               14                 15               16               17               18               19               20               21                 22               23               24               25               26               27               28                 29               30               31                    Date Details   No additional details    Date of next INR:  7/12/2018         How to take your warfarin dose     To take:  5 mg Take 1 of the 5 mg tablets.    To take:  7.5 mg Take 1.5 of the 5 mg tablets.

## 2018-06-08 NOTE — TELEPHONE ENCOUNTER
Has the patient previously taken warfarin? yes  If yes, for what indication? DVT, PE    Does the patient have any of the following indications for a higher range of 2.5-3.5:    Mitral position mechanical valve? no    Rex-Shiley, Ball and Cage or Monoleaflet valve (regardless of position) no    Other (if yes, please explain) no    Annual INR referral needed. Order pended.  Thank you. Tierra Olivarez RN

## 2018-07-12 ENCOUNTER — ANTICOAGULATION THERAPY VISIT (OUTPATIENT)
Dept: NURSING | Facility: CLINIC | Age: 53
End: 2018-07-12
Payer: COMMERCIAL

## 2018-07-12 DIAGNOSIS — I26.99 OTHER PULMONARY EMBOLISM WITHOUT ACUTE COR PULMONALE, UNSPECIFIED CHRONICITY (H): ICD-10-CM

## 2018-07-12 DIAGNOSIS — Z79.01 LONG-TERM (CURRENT) USE OF ANTICOAGULANTS: ICD-10-CM

## 2018-07-12 LAB — INR POINT OF CARE: 2.8 (ref 0.86–1.14)

## 2018-07-12 PROCEDURE — 36416 COLLJ CAPILLARY BLOOD SPEC: CPT

## 2018-07-12 PROCEDURE — 99207 ZZC NO CHARGE NURSE ONLY: CPT

## 2018-07-12 PROCEDURE — 85610 PROTHROMBIN TIME: CPT | Mod: QW

## 2018-07-12 NOTE — MR AVS SNAPSHOT
Arslan Clinton   7/12/2018 7:15 AM   Anticoagulation Therapy Visit    Description:  53 year old male   Provider:  AN ANTI COAG   Department:  An Nurse           INR as of 7/12/2018     Today's INR 2.8      Anticoagulation Summary as of 7/12/2018     INR goal 2.0-3.0   Today's INR 2.8   Full warfarin instructions 5 mg on Mon; 7.5 mg all other days   Next INR check 8/16/2018    Indications   Pulmonary embolism (H) [I26.99]  Long-term (current) use of anticoagulants [Z79.01] [Z79.01]         Your next Anticoagulation Clinic appointment(s)     Jul 12, 2018  7:15 AM CDT   [facility] AN ANTI [facility]AG   [facility]    [address] [facility] [address] [phone]            Aug 16, 2018  7:15 AM CDT   Anticoagulation Visit with AN ANTI COAG   [facility]    [address] [facility] [address] [phone]              Contact Numbers     [facility]  Please call  [phone] to cancel and/or reschedule your appointment, or with any problems or questions regarding your therapy.        July 2018 Details    Sun Mon Tue Wed Thu Fri Sat     1               2               3               4               5               6               7                 8               9               10               11               12      7.5 mg   See details      13      7.5 mg         14      7.5 mg           15      7.5 mg         16      5 mg         17      7.5 mg         18      7.5 mg         19      7.5 mg         20      7.5 mg         21      7.5 mg           22      7.5 mg         23      5 mg         24      7.5 mg         25      7.5 mg         26      7.5 mg         27      7.5 mg         28      7.5 mg           29      7.5 mg         30      5 mg         31      7.5 mg              Date Details   07/12 This INR check               How to take your warfarin dose     To take:  5 mg Take 1 of the 5 mg  tablets.    To take:  7.5 mg Take 1.5 of the 5 mg tablets.           August 2018 Details    Sun Mon Tue Wed Thu Fri Sat        1      7.5 mg         2      7.5 mg         3      7.5 mg         4      7.5 mg           5      7.5 mg         6      5 mg         7      7.5 mg         8      7.5 mg         9      7.5 mg         10      7.5 mg         11      7.5 mg           12      7.5 mg         13      5 mg         14      7.5 mg         15      7.5 mg         16            17               18                 19               20               21               22               23               24               25                 26               27               28               29               30               31                 Date Details   No additional details    Date of next INR:  8/16/2018         How to take your warfarin dose     To take:  5 mg Take 1 of the 5 mg tablets.    To take:  7.5 mg Take 1.5 of the 5 mg tablets.

## 2018-07-12 NOTE — PROGRESS NOTES
ANTICOAGULATION FOLLOW-UP CLINIC VISIT    Patient Name:  Arslan Clinton  Date:  7/12/2018  Contact Type:  Face to Face    SUBJECTIVE:     Patient Findings     Positives No Problem Findings           OBJECTIVE    INR Protime   Date Value Ref Range Status   07/12/2018 2.8 (A) 0.86 - 1.14 Final       ASSESSMENT / PLAN  INR assessment THER    Recheck INR In: 5 WEEKS    INR Location Clinic      Anticoagulation Summary as of 7/12/2018     INR goal 2.0-3.0   Today's INR 2.8   Warfarin maintenance plan 5 mg (5 mg x 1) on Mon; 7.5 mg (5 mg x 1.5) all other days   Full warfarin instructions 5 mg on Mon; 7.5 mg all other days   Weekly warfarin total 50 mg   No change documented Tierra Olivarez RN   Plan last modified Tierra Olivarez RN (10/16/2017)   Next INR check 8/16/2018   Target end date     Indications   Pulmonary embolism (H) [I26.99]  Long-term (current) use of anticoagulants [Z79.01] [Z79.01]         Anticoagulation Episode Summary     INR check location     Preferred lab     Send INR reminders to AN ANTICOAG CLINIC    Comments       Anticoagulation Care Providers     Provider Role Specialty Phone number    Cedric Hodgson PA-C [facility] [phone]            See the Encounter Report to view Anticoagulation Flowsheet and Dosing Calendar (Go to Encounters tab in chart review, and find the Anticoagulation Therapy Visit)        Tierra Olivarez RN

## 2018-07-24 NOTE — PROGRESS NOTES
"  SUBJECTIVE:   CC: Arslan Clinton is an 53 year old male who presents for preventative health visit.     Healthy Habits:    Do you get at least three servings of calcium containing foods daily (dairy, green leafy vegetables, etc.)? {YES/NO, DAIRY INTAKE:705587::\"yes\"}    Amount of exercise or daily activities, outside of work: {AMOUNT EXERCISE:423538}    Problems taking medications regularly {Yes /No default:654249::\"No\"}    Medication side effects: {Yes /No default.:115076::\"No\"}    Have you had an eye exam in the past two years? {YESNOBLANK:099521}    Do you see a dentist twice per year? {YESNOBLANK:766948}    Do you have sleep apnea, excessive snoring or daytime drowsiness?{YESNOBLANK:554712}  {Outside tests to abstract? :755106}     {additional problems to add (Optional):233870}    Today's PHQ-2 Score:   PHQ-2 ( 1999 [employer]) 4/12/2017 2/13/2015   Q1: Little interest or pleasure in doing things 0 0   Q2: Feeling down, depressed or hopeless 0 0   PHQ-2 Score 0 0     {PHQ-2 LOOK IN ASSESSMENTS :639783}  Abuse: Current or Past(Physical, Sexual or Emotional)- {YES/NO/NA:562847}  Do you feel safe in your environment - {YES/NO/NA:359461}    Social History   Substance Use Topics     Smoking status: Never Smoker     Smokeless tobacco: Never Used     Alcohol use Yes      Comment: rare      If you drink alcohol do you typically have >3 drinks per day or >7 drinks per week? {ETOH :993282}                      Last PSA: No results found for: PSA    Reviewed orders with patient. Reviewed health maintenance and updated orders accordingly - {Yes/No:983624::\"Yes\"}  {Chronicprobdata (Optional):720904}    Reviewed and updated as needed this visit by clinical staff         Reviewed and updated as needed this visit by Provider        {HISTORY OPTIONS (Optional):768869}    ROS:  { :449546::\"CONSTITUTIONAL: NEGATIVE for fever, chills, change in weight\",\"INTEGUMENTARY/SKIN: NEGATIVE for worrisome rashes, moles or lesions\",\"EYES: " "NEGATIVE for vision changes or irritation\",\"ENT: NEGATIVE for ear, mouth and throat problems\",\"RESP: NEGATIVE for significant cough or SOB\",\"CV: NEGATIVE for chest pain, palpitations or peripheral edema\",\"GI: NEGATIVE for nausea, abdominal pain, heartburn, or change in bowel habits\",\" male: negative for dysuria, hematuria, decreased urinary stream, erectile dysfunction, urethral discharge\",\"MUSCULOSKELETAL: NEGATIVE for significant arthralgias or myalgia\",\"NEURO: NEGATIVE for weakness, dizziness or paresthesias\",\"PSYCHIATRIC: NEGATIVE for changes in mood or affect\"}    OBJECTIVE:   There were no vitals taken for this visit.  EXAM:  {Exam Choices:419977}    {Diagnostic Test Results (Optional):707936::\"Diagnostic Test Results:\",\"none \"}    ASSESSMENT/PLAN:   {Diag Picklist:031153}    COUNSELING:  {MALE COUNSELING MESSAGES:625663::\"Reviewed preventive health counseling, as reflected in patient instructions\"}    BP Readings from Last 1 Encounters:   04/28/17 98/67     Estimated body mass index is 28.89 kg/(m^2) as calculated from the following:    Height as of 4/26/17: 5' 9\" (1.753 m).    Weight as of 4/26/17: 195 lb 9.6 oz (88.7 kg).    {BP Counseling- Complete if BP >= 120/80  (Optional):499019}  {Weight Management Plan (ACO) Complete if BMI is abnormal-  Ages 18-64  BMI >24.9.  Age 65+ with BMI <23 or >30 (Optional):933754}     reports that he has never smoked. He has never used smokeless tobacco.  {Tobacco Cessation -- Complete if patient is a smoker (Optional):591439}    Counseling Resources:  ATP IV Guidelines  Pooled Cohorts Equation Calculator  FRAX Risk Assessment  ICSI Preventive Guidelines  Dietary Guidelines for Americans, 2010  USDA's MyPlate  ASA Prophylaxis  Lung CA Screening    Cedric Hodgson PA-C  [facility]  "

## 2018-07-25 ENCOUNTER — OFFICE VISIT (OUTPATIENT)
Dept: FAMILY MEDICINE | Facility: CLINIC | Age: 53
End: 2018-07-25
Payer: COMMERCIAL

## 2018-07-25 VITALS
DIASTOLIC BLOOD PRESSURE: 66 MMHG | WEIGHT: 188 LBS | HEART RATE: 59 BPM | RESPIRATION RATE: 14 BRPM | HEIGHT: 70 IN | OXYGEN SATURATION: 98 % | SYSTOLIC BLOOD PRESSURE: 110 MMHG | TEMPERATURE: 97.9 F | BODY MASS INDEX: 26.92 KG/M2

## 2018-07-25 DIAGNOSIS — Z79.01 LONG-TERM (CURRENT) USE OF ANTICOAGULANTS: ICD-10-CM

## 2018-07-25 DIAGNOSIS — I48.92 ATRIAL FLUTTER, UNSPECIFIED TYPE (H): ICD-10-CM

## 2018-07-25 DIAGNOSIS — R56.9 CONVULSIONS, UNSPECIFIED CONVULSION TYPE (H): ICD-10-CM

## 2018-07-25 DIAGNOSIS — Z12.11 SCREEN FOR COLON CANCER: ICD-10-CM

## 2018-07-25 DIAGNOSIS — Z86.718 PERSONAL HISTORY OF VENOUS THROMBOSIS AND EMBOLISM: ICD-10-CM

## 2018-07-25 DIAGNOSIS — Z00.00 ROUTINE GENERAL MEDICAL EXAMINATION AT A HEALTH CARE FACILITY: Primary | ICD-10-CM

## 2018-07-25 DIAGNOSIS — Z11.59 NEED FOR HEPATITIS C SCREENING TEST: ICD-10-CM

## 2018-07-25 LAB
ALBUMIN SERPL-MCNC: 4 G/DL (ref 3.4–5)
ALP SERPL-CCNC: 94 U/L (ref 40–150)
ALT SERPL W P-5'-P-CCNC: 34 U/L (ref 0–70)
ANION GAP SERPL CALCULATED.3IONS-SCNC: 4 MMOL/L (ref 3–14)
AST SERPL W P-5'-P-CCNC: 23 U/L (ref 0–45)
BILIRUB SERPL-MCNC: 0.3 MG/DL (ref 0.2–1.3)
BUN SERPL-MCNC: 17 MG/DL (ref 7–30)
CALCIUM SERPL-MCNC: 8.7 MG/DL (ref 8.5–10.1)
CHLORIDE SERPL-SCNC: 105 MMOL/L (ref 94–109)
CHOLEST SERPL-MCNC: 189 MG/DL
CO2 SERPL-SCNC: 31 MMOL/L (ref 20–32)
CREAT SERPL-MCNC: 1.09 MG/DL (ref 0.66–1.25)
GFR SERPL CREATININE-BSD FRML MDRD: 70 ML/MIN/1.7M2
GLUCOSE SERPL-MCNC: 78 MG/DL (ref 70–99)
HDLC SERPL-MCNC: 46 MG/DL
LDLC SERPL CALC-MCNC: 117 MG/DL
NONHDLC SERPL-MCNC: 143 MG/DL
POTASSIUM SERPL-SCNC: 4.3 MMOL/L (ref 3.4–5.3)
PROT SERPL-MCNC: 7.6 G/DL (ref 6.8–8.8)
SODIUM SERPL-SCNC: 140 MMOL/L (ref 133–144)
TRIGL SERPL-MCNC: 129 MG/DL

## 2018-07-25 PROCEDURE — 80061 LIPID PANEL: CPT | Performed by: PHYSICIAN ASSISTANT

## 2018-07-25 PROCEDURE — 86803 HEPATITIS C AB TEST: CPT | Performed by: PHYSICIAN ASSISTANT

## 2018-07-25 PROCEDURE — 99396 PREV VISIT EST AGE 40-64: CPT | Performed by: PHYSICIAN ASSISTANT

## 2018-07-25 PROCEDURE — 80053 COMPREHEN METABOLIC PANEL: CPT | Performed by: PHYSICIAN ASSISTANT

## 2018-07-25 PROCEDURE — 36415 COLL VENOUS BLD VENIPUNCTURE: CPT | Performed by: PHYSICIAN ASSISTANT

## 2018-07-25 NOTE — LETTER
July 26, 2018    Arslan Clinton  [address] [facility] [address]        Mr. Clinton,     All of your labs were normal/  near normal  for you.     Please contact the clinic if you have additional questions.  Thank you.     Sincerely,     Cedric Hodgson PA-C     Results for orders placed or performed in visit on 07/25/18   Hepatitis C Screen Reflex to HCV RNA Quant and Genotype   Result Value Ref Range    Hepatitis C Antibody Nonreactive NR^Nonreactive   Lipid panel reflex to direct LDL Fasting   Result Value Ref Range    Cholesterol 189 <200 mg/dL    Triglycerides 129 <150 mg/dL    HDL Cholesterol 46 >39 mg/dL    LDL Cholesterol Calculated 117 (H) <100 mg/dL    Non HDL Cholesterol 143 (H) <130 mg/dL   Comprehensive metabolic panel   Result Value Ref Range    Sodium 140 133 - 144 mmol/L    Potassium 4.3 3.4 - 5.3 mmol/L    Chloride 105 94 - 109 mmol/L    Carbon Dioxide 31 20 - 32 mmol/L    Anion Gap 4 3 - 14 mmol/L    Glucose 78 70 - 99 mg/dL    Urea Nitrogen 17 7 - 30 mg/dL    Creatinine 1.09 0.66 - 1.25 mg/dL    GFR Estimate 70 >60 mL/min/1.7m2    GFR Estimate If Black 85 >60 mL/min/1.7m2    Calcium 8.7 8.5 - 10.1 mg/dL    Bilirubin Total 0.3 0.2 - 1.3 mg/dL    Albumin 4.0 3.4 - 5.0 g/dL    Protein Total 7.6 6.8 - 8.8 g/dL    Alkaline Phosphatase 94 40 - 150 U/L    ALT 34 0 - 70 U/L    AST 23 0 - 45 U/L

## 2018-07-25 NOTE — NURSING NOTE
"Chief Complaint   Patient presents with     Physical     Health Maintenance     PHQ2       Initial /66  Pulse 59  Temp 97.9  F (36.6  C) (Oral)  Resp 14  Ht 5' 10\" (1.778 m)  Wt 188 lb (85.3 kg)  SpO2 98%  BMI 26.98 kg/m2 Estimated body mass index is 26.98 kg/(m^2) as calculated from the following:    Height as of this encounter: 5' 10\" (1.778 m).    Weight as of this encounter: 188 lb (85.3 kg).  Medication Reconciliation: complete  Jessica Martini CMA    "

## 2018-07-25 NOTE — PROGRESS NOTES
SUBJECTIVE:   CC: Arslan Clinton is an 53 year old male who presents for preventative health visit.     Physical   Annual:     Getting at least 3 servings of Calcium per day:  Yes    Bi-annual eye exam:  Yes    Dental care twice a year:  Yes    Sleep apnea or symptoms of sleep apnea:  Daytime drowsiness and Excessive snoring    Diet:  Regular (no restrictions)    Frequency of exercise:  4-5 days/week    Duration of exercise:  Greater than 60 minutes    Taking medications regularly:  Yes    Medication side effects:  None    Additional concerns today:  No    No concerns   History of DVT and PE. On coumadin. Suspect secondary to atrial flutter. Hasn't seen cardiology for over 1 yr.   On coumadin     History of seizures -see's neurology yearly.     Today's PHQ-2 Score:   PHQ-2 ( 1999 [employer]) 7/25/2018   Q1: Little interest or pleasure in doing things 0   Q2: Feeling down, depressed or hopeless 0   PHQ-2 Score 0   Q1: Little interest or pleasure in doing things Not at all   Q2: Feeling down, depressed or hopeless Not at all   PHQ-2 Score 0       Abuse: Current or Past(Physical, Sexual or Emotional)- No  Do you feel safe in your environment - Yes    Social History   Substance Use Topics     Smoking status: Never Smoker     Smokeless tobacco: Never Used     Alcohol use Yes      Comment: rare     Alcohol Use 7/25/2018   If you drink alcohol do you typically have greater than 3 drinks per day OR greater than 7 drinks per week? No     Last PSA: No results found for: PSA    Reviewed orders with patient. Reviewed health maintenance and updated orders accordingly - Yes  Labs reviewed in EPIC  BP Readings from Last 3 Encounters:   07/25/18 110/66   04/28/17 98/67   04/28/17 113/89    Wt Readings from Last 3 Encounters:   07/25/18 188 lb (85.3 kg)   04/26/17 195 lb 9.6 oz (88.7 kg)   04/26/17 195 lb 9.6 oz (88.7 kg)                  Patient Active Problem List   Diagnosis     Other acquired deformity of other parts of limb      CARDIOVASCULAR SCREENING; LDL GOAL LESS THAN 160     Tremor     Weight loss     Varicose veins of lower extremities with complications     Personal history of venous thrombosis and embolism     Right leg pain     Superficial thrombophlebitis     Pulmonary embolism (H)     Thyroid nodule     Thyroid cyst     Family history of colon cancer     Long-term (current) use of anticoagulants [Z79.01]     Other pulmonary embolism without acute cor pulmonale, unspecified chronicity (H)     Atrial flutter, unspecified type (H)     Convulsions, unspecified convulsion type (H)     Past Surgical History:   Procedure Laterality Date     ANESTHESIA CARDIOVERSION N/A 4/28/2017    Procedure: ANESTHESIA CARDIOVERSION;  Anesthesia Cardioversion ;  Surgeon: GENERIC ANESTHESIA PROVIDER;  Location: [facility] OR     ARTHROSCOPY KNEE RT/LT       HERNIA REPAIR, INGUINAL RT/LT      Hernia Repair, RT/LT       Social History   Substance Use Topics     Smoking status: Never Smoker     Smokeless tobacco: Never Used     Alcohol use Yes      Comment: rare     Family History   Problem Relation Age of Onset     HEART DISEASE Mother      Cancer - colorectal Father      late 60's     Thyroid Disease Father      HEART DISEASE Other      Cancer Other      HEART DISEASE Sister          Current Outpatient Prescriptions   Medication Sig Dispense Refill     Acetaminophen (TYLENOL EXTRA STRENGTH PO) Take 2 tablets by mouth every 6 hours as needed Reported on 4/26/2017       phenytoin (DILANTIN) 100 MG ER capsule Take 5 capsules by mouth daily.       warfarin (COUMADIN) 5 MG tablet Take daily as directed. Current dose is 5 mg Monday and 7.5 mg rest of wk days 135 tablet 1     Allergies   Allergen Reactions     Penicillins        Reviewed and updated as needed this visit by clinical staff  Tobacco  Allergies  Meds  Med Hx  Surg Hx  Fam Hx  Soc Hx        Reviewed and updated as needed this visit by Provider          Past Medical History:   Diagnosis Date      "Seizures (H)       Past Surgical History:   Procedure Laterality Date     ANESTHESIA CARDIOVERSION N/A 4/28/2017    Procedure: ANESTHESIA CARDIOVERSION;  Anesthesia Cardioversion ;  Surgeon: GENERIC ANESTHESIA PROVIDER;  Location: [facility] OR     ARTHROSCOPY KNEE RT/LT       HERNIA REPAIR, INGUINAL RT/LT      Hernia Repair, RT/LT       Review of Systems  CONSTITUTIONAL: NEGATIVE for fever, chills, change in weight  INTEGUMENTARY/SKIN: NEGATIVE for worrisome rashes, moles or lesions  EYES: NEGATIVE for vision changes or irritation  ENT: NEGATIVE for ear, mouth and throat problems  RESP: NEGATIVE for significant cough or SOB  CV: NEGATIVE for chest pain, palpitations or peripheral edema  GI: NEGATIVE for nausea, abdominal pain, heartburn, or change in bowel habits   male: negative for dysuria, hematuria, decreased urinary stream, erectile dysfunction, urethral discharge  MUSCULOSKELETAL: NEGATIVE for significant arthralgias or myalgia  NEURO: NEGATIVE for weakness, dizziness or paresthesias  PSYCHIATRIC: NEGATIVE for changes in mood or affect    OBJECTIVE:   /66  Pulse 59  Temp 97.9  F (36.6  C) (Oral)  Resp 14  Ht 5' 10\" (1.778 m)  Wt 188 lb (85.3 kg)  SpO2 98%  BMI 26.98 kg/m2    Physical Exam  GENERAL: healthy, alert and no distress  EYES: Eyes grossly normal to inspection, PERRL and conjunctivae and sclerae normal  HENT: ear canals and TM's normal, nose and mouth without ulcers or lesions  NECK: no adenopathy, no asymmetry, masses, or scars and thyroid normal to palpation  RESP: lungs clear to auscultation - no rales, rhonchi or wheezes  CV: regular rate and rhythm, normal S1 S2, no S3 or S4, no murmur, click or rub, no peripheral edema and peripheral pulses strong  ABDOMEN: soft, nontender, no hepatosplenomegaly, no masses and bowel sounds normal   (male): normal male genitalia without lesions or urethral discharge, no hernia  MS: no gross musculoskeletal defects noted, no edema  SKIN: no suspicious " "lesions or rashes  NEURO: Normal strength and tone, mentation intact and speech normal  PSYCH: mentation appears normal, affect normal/bright    Diagnostic Test Results:  No results found for this or any previous visit (from the past 24 hour(s)).    ASSESSMENT/PLAN:       ICD-10-CM    1. Routine general medical examination at a health care facility Z00.00 Lipid panel reflex to direct LDL Fasting     Comprehensive metabolic panel   2. Personal history of venous thrombosis and embolism Z86.718    3. Long-term (current) use of anticoagulants [Z79.01] Z79.01    4. Atrial flutter, unspecified type (H) I48.92    5. Convulsions, unspecified convulsion type (H) R56.9    6. Screen for colon cancer Z12.11 GASTROENTEROLOGY ADULT REF PROCEDURE ONLY   7. Need for hepatitis C screening test Z11.59 Hepatitis C Screen Reflex to HCV RNA Quant and Genotype   1. Labs pending. Work on Healthy diet and exercise. Getting heart rate elevated for 30 mins most days of week.  2-4. Stable. Recommend follow up  With cardiology as it is unclear how often he is to follow up .   5. Stable. Cont follow up  With neurology  Recheck yearly.     COUNSELING:   Reviewed preventive health counseling, as reflected in patient instructions       Regular exercise       Healthy diet/nutrition    BP Readings from Last 1 Encounters:   07/25/18 110/66     Estimated body mass index is 26.98 kg/(m^2) as calculated from the following:    Height as of this encounter: 5' 10\" (1.778 m).    Weight as of this encounter: 188 lb (85.3 kg).           reports that he has never smoked. He has never used smokeless tobacco.      Counseling Resources:  ATP IV Guidelines  Pooled Cohorts Equation Calculator  FRAX Risk Assessment  ICSI Preventive Guidelines  Dietary Guidelines for Americans, 2010  USDA's MyPlate  ASA Prophylaxis  Lung CA Screening    Cedric Hodgson PA-C  [facility]  "

## 2018-07-25 NOTE — MR AVS SNAPSHOT
After Visit Summary   7/25/2018    Arslan Clinton    MRN: [id number]           Patient Information     Date Of Birth          1965        Visit Information        Provider Department      7/25/2018 7:00 AM Cedric Hodgson PA-C [facility]        Today's Diagnoses     Routine general medical examination at a health care facility    -  1    Personal history of venous thrombosis and embolism        Long-term (current) use of anticoagulants [Z79.01]        Atrial flutter, unspecified type (H)        Convulsions, unspecified convulsion type (H)        Screen for colon cancer        Need for hepatitis C screening test          Care Instructions      Preventive Health Recommendations  Male Ages 50 - 64    Yearly exam:             See your health care provider every year in order to  o   Review health changes.   o   Discuss preventive care.    o   Review your medicines if your doctor has prescribed any.     Have a cholesterol test every 5 years, or more frequently if you are at risk for high cholesterol/heart disease.     Have a diabetes test (fasting glucose) every three years. If you are at risk for diabetes, you should have this test more often.     Have a colonoscopy at age 50, or have a yearly FIT test (stool test). These exams will check for colon cancer.      Talk with your health care provider about whether or not a prostate cancer screening test (PSA) is right for you.    You should be tested each year for STDs (sexually transmitted diseases), if you re at risk.     Shots: Get a flu shot each year. Get a tetanus shot every 10 years.     Nutrition:    Eat at least 5 servings of fruits and vegetables daily.     Eat whole-grain bread, whole-wheat pasta and brown rice instead of white grains and rice.     Get adequate Calcium and Vitamin D.     Lifestyle    Exercise for at least 150 minutes a week (30 minutes a day, 5 days a week). This will help you control your weight and  prevent disease.     Limit alcohol to one drink per day.     No smoking.     Wear sunscreen to prevent skin cancer.     See your dentist every six months for an exam and cleaning.     See your eye doctor every 1 to 2 years.            Follow-ups after your visit        Additional Services     GASTROENTEROLOGY ADULT REF PROCEDURE ONLY       Last Lab Result: Creatinine (mg/dL)       Date                     Value                 04/28/2017               1.04             ----------  Body mass index is 26.98 kg/(m^2).      Patient will be contacted to schedule procedure.     Please be aware that coverage of these services is subject to the terms and limitations of your health insurance plan.  Call member services at your health plan with any benefit or coverage questions.  Any procedures must be performed at a [facility] facility OR coordinated by your clinic's referral office.    Please bring the following with you to your appointment:    (1) Any X-Rays, CTs or MRIs which have been performed.  Contact the facility where they were done to arrange for  prior to your scheduled appointment.    (2) List of current medications   (3) This referral request   (4) Any documents/labs given to you for this referral                  Your next 10 appointments already scheduled     Aug 16, 2018  7:15 AM CDT   [facility]    [address] [facility] [address] [phone]              Who to contact     If you have questions or need follow up information about today's clinic visit or your schedule please contact [facility] directly at [phone].  Normal or non-critical lab and imaging results will be communicated to you by MyChart, letter or phone within 4 business days after the clinic has received the results. If you do not hear from us within 7 days, please contact the clinic through MyChart or phone. If you have a  "critical or abnormal lab result, we will notify you by phone as soon as possible.  Submit refill requests through [employer] or call your pharmacy and they will forward the refill request to us. Please allow 3 business days for your refill to be completed.          Additional Information About Your Visit        Care EveryWhere ID     This is your Care EveryWhere ID. This could be used by other organizations to access your [facility] medical records  [id number]        Your Vitals Were     Pulse Temperature Respirations Height Pulse Oximetry BMI (Body Mass Index)    59 97.9  F (36.6  C) (Oral) 14 5' 10\" (1.778 m) 98% 26.98 kg/m2       Blood Pressure from Last 3 Encounters:   07/25/18 110/66   04/28/17 98/67   04/28/17 113/89    Weight from Last 3 Encounters:   07/25/18 188 lb (85.3 kg)   04/26/17 195 lb 9.6 oz (88.7 kg)   04/26/17 195 lb 9.6 oz (88.7 kg)              We Performed the Following     Comprehensive metabolic panel     GASTROENTEROLOGY ADULT REF PROCEDURE ONLY     Hepatitis C Screen Reflex to HCV RNA Quant and Genotype     Lipid panel reflex to direct LDL Fasting        Primary Care Provider Office Phone # Fax #    Cedric Hodgson PA-C [phone] [address] [facility] [address]        Equal Access to Services     MARIA R ZAMUDIO : Hadii aad ku hadasho Soomaali, waaxda luqadaha, qaybta kaalmada adeegyada, waxay glenna hayjose luis sandhu . So [facility] [phone].    ATENCIÓN: Si habla español, tiene a torrez disposición servicios gratuitos de asistencia lingüística. Llame al [phone].    We comply with applicable federal civil rights laws and [place] laws. We do not discriminate on the basis of race, color, national origin, age, disability, sex, sexual orientation, or gender identity.            Thank you!     Thank you for choosing [facility]  for your care. Our goal is always to provide you with excellent care. Hearing back from our patients is one way we can " continue to improve our services. Please take a few minutes to complete the written survey that you may receive in the mail after your visit with us. Thank you!             Your Updated Medication List - Protect others around you: Learn how to safely use, store and throw away your medicines at www.disposemymeds.org.          This list is accurate as of 7/25/18  7:21 AM.  Always use your most recent med list.                   Brand Name Dispense Instructions for use Diagnosis    DILANTIN 100 MG CR capsule   Generic drug:  phenytoin      Take 5 capsules by mouth daily.        TYLENOL EXTRA STRENGTH PO      Take 2 tablets by mouth every 6 hours as needed Reported on 4/26/2017        warfarin 5 MG tablet    COUMADIN    135 tablet    Take daily as directed. Current dose is 5 mg Monday and 7.5 mg rest of wk days    Pulmonary embolism (H)

## 2018-07-26 LAB — HCV AB SERPL QL IA: NONREACTIVE

## 2018-07-26 NOTE — PROGRESS NOTES
Mr. Clinton,    All of your labs were normal/  near normal  for you.    Please contact the clinic if you have additional questions.  Thank you.    Sincerely,    Cedric Hodgson PA-C

## 2018-08-16 ENCOUNTER — ANTICOAGULATION THERAPY VISIT (OUTPATIENT)
Dept: NURSING | Facility: CLINIC | Age: 53
End: 2018-08-16
Payer: COMMERCIAL

## 2018-08-16 DIAGNOSIS — I26.99 OTHER PULMONARY EMBOLISM WITHOUT ACUTE COR PULMONALE, UNSPECIFIED CHRONICITY (H): ICD-10-CM

## 2018-08-16 DIAGNOSIS — Z79.01 LONG-TERM (CURRENT) USE OF ANTICOAGULANTS: ICD-10-CM

## 2018-08-16 LAB — INR POINT OF CARE: 3.2 (ref 0.86–1.14)

## 2018-08-16 PROCEDURE — 99207 ZZC NO CHARGE NURSE ONLY: CPT

## 2018-08-16 PROCEDURE — 36416 COLLJ CAPILLARY BLOOD SPEC: CPT

## 2018-08-16 PROCEDURE — 85610 PROTHROMBIN TIME: CPT | Mod: QW

## 2018-08-16 NOTE — PROGRESS NOTES
ANTICOAGULATION FOLLOW-UP CLINIC VISIT    Patient Name:  Arslan Clinton  Date:  8/16/2018  Contact Type:  Face to Face    SUBJECTIVE:     Patient Findings     Positives Change in diet/appetite, No Problem Findings    Comments Eaten less salad in past month. He will adjust diet.           OBJECTIVE    INR Protime   Date Value Ref Range Status   08/16/2018 3.2 (A) 0.86 - 1.14 Final       ASSESSMENT / PLAN  INR assessment SUPRA    Recheck INR In: 5 WEEKS    INR Location Clinic      Anticoagulation Summary as of 8/16/2018     INR goal 2.0-3.0   Today's INR 3.2!   Warfarin maintenance plan 5 mg (5 mg x 1) on Mon; 7.5 mg (5 mg x 1.5) all other days   Full warfarin instructions 5 mg on Mon; 7.5 mg all other days   Weekly warfarin total 50 mg   No change documented Tierra Olivarez RN   Plan last modified Tierra Olivarez RN (10/16/2017)   Next INR check 9/20/2018   Target end date     Indications   Pulmonary embolism (H) [I26.99]  Long-term (current) use of anticoagulants [Z79.01] [Z79.01]         Anticoagulation Episode Summary     INR check location     Preferred lab     Send INR reminders to AN ANTICOAG CLINIC    Comments       Anticoagulation Care Providers     Provider Role Specialty Phone number    Cedric Hodgson PA-C [facility] [phone]            See the Encounter Report to view Anticoagulation Flowsheet and Dosing Calendar (Go to Encounters tab in chart review, and find the Anticoagulation Therapy Visit)        Tierra Olivarez RN

## 2018-08-16 NOTE — MR AVS SNAPSHOT
Arslan Clinton   8/16/2018 7:15 AM   Anticoagulation Therapy Visit    Description:  53 year old male   Provider:  AN ANTI COAG   Department:  An Nurse           INR as of 8/16/2018     Today's INR 3.2!      Anticoagulation Summary as of 8/16/2018     INR goal 2.0-3.0   Today's INR 3.2!   Full warfarin instructions 5 mg on Mon; 7.5 mg all other days   Next INR check 9/20/2018    Indications   Pulmonary embolism (H) [I26.99]  Long-term (current) use of anticoagulants [Z79.01] [Z79.01]         Your next Anticoagulation Clinic appointment(s)     Sep 20, 2018  7:15 AM CDT   [facility] AN ANTI [facility]AG   [facility]    [address] [facility] [address] [phone]              Contact Numbers     [facility]  Please call  [phone] to cancel and/or reschedule your appointment, or with any problems or questions regarding your therapy.        August 2018 Details    Sun Mon Tue Wed Thu Fri Sat        1               2               3               4                 5               6               7               8               9               10               11                 12               13               14               15               16      7.5 mg   See details      17      7.5 mg         18      7.5 mg           19      7.5 mg         20      5 mg         21      7.5 mg         22      7.5 mg         23      7.5 mg         24      7.5 mg         25      7.5 mg           26      7.5 mg         27      5 mg         28      7.5 mg         29      7.5 mg         30      7.5 mg         31      7.5 mg           Date Details   08/16 This INR check               How to take your warfarin dose     To take:  5 mg Take 1 of the 5 mg tablets.    To take:  7.5 mg Take 1.5 of the 5 mg tablets.           September 2018 Details    Sun Mon Tue Wed Thu Fri Sat           1      7.5 mg           2      7.5 mg         3      5 mg         4      7.5 mg          5      7.5 mg         6      7.5 mg         7      7.5 mg         8      7.5 mg           9      7.5 mg         10      5 mg         11      7.5 mg         12      7.5 mg         13      7.5 mg         14      7.5 mg         15      7.5 mg           16      7.5 mg         17      5 mg         18      7.5 mg         19      7.5 mg         20            21               22                 23               24               25               26               27               28               29                 30                      Date Details   No additional details    Date of next INR:  9/20/2018         How to take your warfarin dose     To take:  5 mg Take 1 of the 5 mg tablets.    To take:  7.5 mg Take 1.5 of the 5 mg tablets.

## 2018-09-20 ENCOUNTER — ANTICOAGULATION THERAPY VISIT (OUTPATIENT)
Dept: NURSING | Facility: CLINIC | Age: 53
End: 2018-09-20
Payer: COMMERCIAL

## 2018-09-20 DIAGNOSIS — Z79.01 LONG-TERM (CURRENT) USE OF ANTICOAGULANTS: ICD-10-CM

## 2018-09-20 DIAGNOSIS — I26.99 OTHER PULMONARY EMBOLISM WITHOUT ACUTE COR PULMONALE, UNSPECIFIED CHRONICITY (H): ICD-10-CM

## 2018-09-20 LAB — INR POINT OF CARE: 2.9 (ref 0.86–1.14)

## 2018-09-20 PROCEDURE — 99207 ZZC NO CHARGE NURSE ONLY: CPT

## 2018-09-20 PROCEDURE — 85610 PROTHROMBIN TIME: CPT | Mod: QW

## 2018-09-20 PROCEDURE — 36416 COLLJ CAPILLARY BLOOD SPEC: CPT

## 2018-09-20 NOTE — PROGRESS NOTES
ANTICOAGULATION FOLLOW-UP CLINIC VISIT    Patient Name:  Arslan Clinton  Date:  9/20/2018  Contact Type:  Face to Face    SUBJECTIVE:     Patient Findings     Positives No Problem Findings           OBJECTIVE    INR Protime   Date Value Ref Range Status   09/20/2018 2.9 (A) 0.86 - 1.14 Final       ASSESSMENT / PLAN  INR assessment THER    Recheck INR In: 5 WEEKS    INR Location Clinic      Anticoagulation Summary as of 9/20/2018     INR goal 2.0-3.0   Today's INR 2.9   Warfarin maintenance plan 5 mg (5 mg x 1) on Mon; 7.5 mg (5 mg x 1.5) all other days   Full warfarin instructions 5 mg on Mon; 7.5 mg all other days   Weekly warfarin total 50 mg   No change documented Tierra Olivarze RN   Plan last modified Tierra Olivarez RN (10/16/2017)   Next INR check 10/26/2018   Target end date     Indications   Pulmonary embolism (H) [I26.99]  Long-term (current) use of anticoagulants [Z79.01] [Z79.01]         Anticoagulation Episode Summary     INR check location     Preferred lab     Send INR reminders to AN ANTICOAG CLINIC    Comments       Anticoagulation Care Providers     Provider Role Specialty Phone number    Cedric Hodgson PA-C [facility] [phone]            See the Encounter Report to view Anticoagulation Flowsheet and Dosing Calendar (Go to Encounters tab in chart review, and find the Anticoagulation Therapy Visit)        Tierra Olivarez RN

## 2018-09-20 NOTE — MR AVS SNAPSHOT
Arslan Clinton   9/20/2018 7:15 AM   Anticoagulation Therapy Visit    Description:  53 year old male   Provider:  AN ANTI COAG   Department:  An Nurse           INR as of 9/20/2018     Today's INR 2.9      Anticoagulation Summary as of 9/20/2018     INR goal 2.0-3.0   Today's INR 2.9   Full warfarin instructions 5 mg on Mon; 7.5 mg all other days   Next INR check 10/26/2018    Indications   Pulmonary embolism (H) [I26.99]  Long-term (current) use of anticoagulants [Z79.01] [Z79.01]         Your next Anticoagulation Clinic appointment(s)     Sep 20, 2018  7:15 AM CDT   [facility]    [address] [facility] [address] [phone]            Oct 26, 2018  7:15 AM CDT   [facility]agulation [facility]AG   [facility]    [address] [facility] [address] [phone]              Contact Numbers     [facility]  Please call  [phone] to cancel and/or reschedule your appointment, or with any problems or questions regarding your therapy.        September 2018 Details    Sun Mon Tue Wed Thu Fri Sat           1                 2               3               4               5               6               7               8                 9               10               11               12               13               14               15                 16               17               18               19               20      7.5 mg   See details      21      7.5 mg         22      7.5 mg           23      7.5 mg         24      5 mg         25      7.5 mg         26      7.5 mg         27      7.5 mg         28      7.5 mg         29      7.5 mg           30      7.5 mg                Date Details   09/20 This INR check               How to take your warfarin dose     To take:  5 mg Take 1 of the 5 mg tablets.    To take:  7.5 mg Take 1.5 of the 5 mg  tablets.           October 2018 Details    Sun Mon Tue Wed Thu Fri Sat      1      5 mg         2      7.5 mg         3      7.5 mg         4      7.5 mg         5      7.5 mg         6      7.5 mg           7      7.5 mg         8      5 mg         9      7.5 mg         10      7.5 mg         11      7.5 mg         12      7.5 mg         13      7.5 mg           14      7.5 mg         15      5 mg         16      7.5 mg         17      7.5 mg         18      7.5 mg         19      7.5 mg         20      7.5 mg           21      7.5 mg         22      5 mg         23      7.5 mg         24      7.5 mg         25      7.5 mg         26            27                 28               29               30               31                   Date Details   No additional details    Date of next INR:  10/26/2018         How to take your warfarin dose     To take:  5 mg Take 1 of the 5 mg tablets.    To take:  7.5 mg Take 1.5 of the 5 mg tablets.

## 2018-10-26 ENCOUNTER — ANTICOAGULATION THERAPY VISIT (OUTPATIENT)
Dept: NURSING | Facility: CLINIC | Age: 53
End: 2018-10-26
Payer: COMMERCIAL

## 2018-10-26 DIAGNOSIS — I26.99 OTHER PULMONARY EMBOLISM WITHOUT ACUTE COR PULMONALE, UNSPECIFIED CHRONICITY (H): ICD-10-CM

## 2018-10-26 LAB — INR POINT OF CARE: 2.7 (ref 0.86–1.14)

## 2018-10-26 PROCEDURE — 85610 PROTHROMBIN TIME: CPT | Mod: QW

## 2018-10-26 PROCEDURE — 36416 COLLJ CAPILLARY BLOOD SPEC: CPT

## 2018-10-26 PROCEDURE — 99207 ZZC NO CHARGE NURSE ONLY: CPT

## 2018-10-26 NOTE — PROGRESS NOTES
ANTICOAGULATION FOLLOW-UP CLINIC VISIT    Patient Name:  Arslan Clinton  Date:  10/26/2018  Contact Type:  Face to Face    SUBJECTIVE:     Patient Findings     Positives No Problem Findings           OBJECTIVE    INR Protime   Date Value Ref Range Status   10/26/2018 2.7 (A) 0.86 - 1.14 Final       ASSESSMENT / PLAN  INR assessment THER    Recheck INR In: 5 WEEKS    INR Location Clinic      Anticoagulation Summary as of 10/26/2018     INR goal 2.0-3.0   Today's INR 2.7   Warfarin maintenance plan 5 mg (5 mg x 1) on Mon; 7.5 mg (5 mg x 1.5) all other days   Full warfarin instructions 5 mg on Mon; 7.5 mg all other days   Weekly warfarin total 50 mg   No change documented Tierra Olivarez RN   Plan last modified Tierra Olivarez RN (10/16/2017)   Next INR check 11/30/2018   Target end date     Indications   Pulmonary embolism (H) [I26.99]  Long-term (current) use of anticoagulants [Z79.01] [Z79.01]         Anticoagulation Episode Summary     INR check location     Preferred lab     Send INR reminders to AN [facility]    Comments       Anticoagulation Care Providers     Provider Role Specialty Phone number    Cedric Hodgson PA-C [facility] [phone]            See the Encounter Report to view Anticoagulation Flowsheet and Dosing Calendar (Go to Encounters tab in chart review, and find the Anticoagulation Therapy Visit)        Tierra Olivarez RN

## 2018-10-26 NOTE — MR AVS SNAPSHOT
Arslan Clinton   10/26/2018 7:15 AM   Anticoagulation Therapy Visit    Description:  53 year old male   Provider:  AN ANTI COAG   Department:  An Nurse           INR as of 10/26/2018     Today's INR 2.7      Anticoagulation Summary as of 10/26/2018     INR goal 2.0-3.0   Today's INR 2.7   Full warfarin instructions 5 mg on Mon; 7.5 mg all other days   Next INR check 11/30/2018    Indications   Pulmonary embolism (H) [I26.99]  Long-term (current) use of anticoagulants [Z79.01] [Z79.01]         Your next Anticoagulation Clinic appointment(s)     Nov 30, 2018  7:15 AM CST   Anticoagulation Visit with AN ANTI COAG   [facility] ([facility]    [address] [facility] [address] [phone]              Contact Numbers     [facility]  Please call  [phone] to cancel and/or reschedule your appointment, or with any problems or questions regarding your therapy.        October 2018 Details    Sun Mon Tue Wed Thu Fri Sat      1               2               3               4               5               6                 7               8               9               10               11               12               13                 14               15               16               17               18               19               20                 21               22               23               24               25               26      7.5 mg   See details      27      7.5 mg           28      7.5 mg         29      5 mg         30      7.5 mg         31      7.5 mg             Date Details   10/26 This INR check               How to take your warfarin dose     To take:  5 mg Take 1 of the 5 mg tablets.    To take:  7.5 mg Take 1.5 of the 5 mg tablets.           November 2018 Details    Sun Mon Tue Wed Thu Fri Sat         1      7.5 mg         2      7.5 mg         3      7.5 mg           4      7.5 mg         5      5 mg         6      7.5 mg         7      7.5  mg         8      7.5 mg         9      7.5 mg         10      7.5 mg           11      7.5 mg         12      5 mg         13      7.5 mg         14      7.5 mg         15      7.5 mg         16      7.5 mg         17      7.5 mg           18      7.5 mg         19      5 mg         20      7.5 mg         21      7.5 mg         22      7.5 mg         23      7.5 mg         24      7.5 mg           25      7.5 mg         26      5 mg         27      7.5 mg         28      7.5 mg         29      7.5 mg         30              Date Details   No additional details    Date of next INR:  11/30/2018         How to take your warfarin dose     To take:  5 mg Take 1 of the 5 mg tablets.    To take:  7.5 mg Take 1.5 of the 5 mg tablets.

## 2018-11-30 ENCOUNTER — ANTICOAGULATION THERAPY VISIT (OUTPATIENT)
Dept: NURSING | Facility: CLINIC | Age: 53
End: 2018-11-30
Payer: COMMERCIAL

## 2018-11-30 DIAGNOSIS — I26.99 OTHER PULMONARY EMBOLISM WITHOUT ACUTE COR PULMONALE, UNSPECIFIED CHRONICITY (H): ICD-10-CM

## 2018-11-30 LAB — INR POINT OF CARE: 2.1 (ref 0.86–1.14)

## 2018-11-30 PROCEDURE — 99207 ZZC NO CHARGE NURSE ONLY: CPT

## 2018-11-30 PROCEDURE — 85610 PROTHROMBIN TIME: CPT | Mod: QW

## 2018-11-30 PROCEDURE — 36416 COLLJ CAPILLARY BLOOD SPEC: CPT

## 2018-11-30 NOTE — PROGRESS NOTES
ANTICOAGULATION FOLLOW-UP CLINIC VISIT    Patient Name:  Arslan Clinton  Date:  11/30/2018  Contact Type:  Face to Face    SUBJECTIVE:     Patient Findings     Positives No Problem Findings           OBJECTIVE    INR Protime   Date Value Ref Range Status   11/30/2018 2.1 (A) 0.86 - 1.14 Final       ASSESSMENT / PLAN  INR assessment THER    Recheck INR In: 5 WEEKS    INR Location Clinic      Anticoagulation Summary as of 11/30/2018     INR goal 2.0-3.0   Today's INR 2.1   Warfarin maintenance plan 5 mg (5 mg x 1) on Mon; 7.5 mg (5 mg x 1.5) all other days   Full warfarin instructions 5 mg on Mon; 7.5 mg all other days   Weekly warfarin total 50 mg   No change documented Tierra Olivarez RN   Plan last modified Tierra Olivarez RN (10/16/2017)   Next INR check 1/4/2019   Target end date     Indications   Pulmonary embolism (H) [I26.99]  Long-term (current) use of anticoagulants [Z79.01] [Z79.01]         Anticoagulation Episode Summary     INR check location     Preferred lab     Send INR reminders to AN [facility]    Comments       Anticoagulation Care Providers     Provider Role Specialty Phone number    Cedric Hodgson PA-C [facility] [phone]            See the Encounter Report to view Anticoagulation Flowsheet and Dosing Calendar (Go to Encounters tab in chart review, and find the Anticoagulation Therapy Visit)        Tierra Olivarez RN

## 2018-11-30 NOTE — MR AVS SNAPSHOT
Arslan Clinton   11/30/2018 7:15 AM   Anticoagulation Therapy Visit    Description:  53 year old male   Provider:  AN ANTI COAG   Department:  An Nurse           INR as of 11/30/2018     Today's INR 2.1      Anticoagulation Summary as of 11/30/2018     INR goal 2.0-3.0   Today's INR 2.1   Full warfarin instructions 5 mg on Mon; 7.5 mg all other days   Next INR check 1/4/2019    Indications   Pulmonary embolism (H) [I26.99]  Long-term (current) use of anticoagulants [Z79.01] [Z79.01]         Your next Anticoagulation Clinic appointment(s)     Jan 04, 2019  7:15 AM CST   [facility]    [address] [facility] [address] [phone]              Contact Numbers     [facility]  Please call  [phone] to cancel and/or reschedule your appointment, or with any problems or questions regarding your therapy.        November 2018 Details    Sun Mon Tue Wed Thu Fri Sat         1               2               3                 4               5               6               7               8               9               10                 11               12               13               14               15               16               17                 18               19               20               21               22               23               24                 25               26               27               28               29               30      7.5 mg   See details        Date Details   11/30 This INR check               How to take your warfarin dose     To take:  7.5 mg Take 1.5 of the 5 mg tablets.           December 2018 Details    Sun Mon Tue Wed Thu Fri Sat           1      7.5 mg           2      7.5 mg         3      5 mg         4      7.5 mg         5      7.5 mg         6      7.5 mg         7      7.5 mg         8      7.5 mg           9      7.5 mg         10      5 mg         11       7.5 mg         12      7.5 mg         13      7.5 mg         14      7.5 mg         15      7.5 mg           16      7.5 mg         17      5 mg         18      7.5 mg         19      7.5 mg         20      7.5 mg         21      7.5 mg         22      7.5 mg           23      7.5 mg         24      5 mg         25      7.5 mg         26      7.5 mg         27      7.5 mg         28      7.5 mg         29      7.5 mg           30      7.5 mg         31      5 mg               Date Details   No additional details            How to take your warfarin dose     To take:  5 mg Take 1 of the 5 mg tablets.    To take:  7.5 mg Take 1.5 of the 5 mg tablets.           January 2019 Details    Sun Mon Tue Wed Thu Fri Sat       1      7.5 mg         2      7.5 mg         3      7.5 mg         4            5                 6               7               8               9               10               11               12                 13               14               15               16               17               18               19                 20               21               22               23               24               25               26                 27               28               29               30               31                  Date Details   No additional details    Date of next INR:  1/4/2019         How to take your warfarin dose     To take:  7.5 mg Take 1.5 of the 5 mg tablets.

## 2019-01-04 ENCOUNTER — ANTICOAGULATION THERAPY VISIT (OUTPATIENT)
Dept: NURSING | Facility: CLINIC | Age: 54
End: 2019-01-04
Payer: COMMERCIAL

## 2019-01-04 DIAGNOSIS — I26.99 PULMONARY EMBOLISM (H): ICD-10-CM

## 2019-01-04 DIAGNOSIS — I26.99 OTHER PULMONARY EMBOLISM WITHOUT ACUTE COR PULMONALE, UNSPECIFIED CHRONICITY (H): ICD-10-CM

## 2019-01-04 LAB — INR POINT OF CARE: 2.3 (ref 0.86–1.14)

## 2019-01-04 PROCEDURE — 85610 PROTHROMBIN TIME: CPT | Mod: QW

## 2019-01-04 PROCEDURE — 36416 COLLJ CAPILLARY BLOOD SPEC: CPT

## 2019-01-04 PROCEDURE — 99207 ZZC NO CHARGE NURSE ONLY: CPT

## 2019-01-04 RX ORDER — WARFARIN SODIUM 5 MG/1
TABLET ORAL
Qty: 135 TABLET | Refills: 0 | Status: SHIPPED | OUTPATIENT
Start: 2019-01-04 | End: 2019-04-26

## 2019-01-04 NOTE — PROGRESS NOTES
ANTICOAGULATION FOLLOW-UP CLINIC VISIT    Patient Name:  Arslan Clinton  Date:  2019  Contact Type:  Face to Face    SUBJECTIVE:     Patient Findings     Positives:   No Problem Findings           OBJECTIVE    INR Protime   Date Value Ref Range Status   2019 2.3 (A) 0.86 - 1.14 Final       ASSESSMENT / PLAN  INR assessment THER    Recheck INR In: 5 WEEKS    INR Location Clinic      Anticoagulation Summary  As of 2019    INR goal:   2.0-3.0   TTR:   75.0 % (2.7 y)   INR used for dosin.3 (2019)   Warfarin maintenance plan:   5 mg (5 mg x 1) every Mon; 7.5 mg (5 mg x 1.5) all other days   Full warfarin instructions:   5 mg every Mon; 7.5 mg all other days   Weekly warfarin total:   50 mg   No change documented:   Tierra Olivarez RN   Plan last modified:   Tierra Olivarez RN (10/16/2017)   Next INR check:   2019   Target end date:       Indications    Pulmonary embolism (H) [I26.99]  Long-term (current) use of anticoagulants [Z79.01] [Z79.01]             Anticoagulation Episode Summary     INR check location:       Preferred lab:       Send INR reminders to:   AN [facility]    Comments:         Anticoagulation Care Providers     Provider Role Specialty Phone number    Rema, Cedric Waterman PA-C [facility] [phone]            See the Encounter Report to view Anticoagulation Flowsheet and Dosing Calendar (Go to Encounters tab in chart review, and find the Anticoagulation Therapy Visit)        Tierra Olivarez RN

## 2019-02-15 ENCOUNTER — ANTICOAGULATION THERAPY VISIT (OUTPATIENT)
Dept: NURSING | Facility: CLINIC | Age: 54
End: 2019-02-15
Payer: COMMERCIAL

## 2019-02-15 DIAGNOSIS — I26.99 OTHER PULMONARY EMBOLISM WITHOUT ACUTE COR PULMONALE, UNSPECIFIED CHRONICITY (H): ICD-10-CM

## 2019-02-15 LAB — INR POINT OF CARE: 2.2 (ref 0.86–1.14)

## 2019-02-15 PROCEDURE — 99207 ZZC NO CHARGE NURSE ONLY: CPT

## 2019-02-15 PROCEDURE — 36416 COLLJ CAPILLARY BLOOD SPEC: CPT

## 2019-02-15 PROCEDURE — 85610 PROTHROMBIN TIME: CPT | Mod: QW

## 2019-02-15 NOTE — PROGRESS NOTES
ANTICOAGULATION FOLLOW-UP CLINIC VISIT    Patient Name:  Arslan Clinton  Date:  2/15/2019  Contact Type:  Face to Face    SUBJECTIVE:     Patient Findings     Positives:   No Problem Findings           OBJECTIVE    INR Protime   Date Value Ref Range Status   02/15/2019 2.2 (A) 0.86 - 1.14 Final       ASSESSMENT / PLAN  INR assessment THER    Recheck INR In: 5 WEEKS    INR Location Clinic      Anticoagulation Summary  As of 2/15/2019    INR goal:   2.0-3.0   TTR:   76.0 % (2.8 y)   INR used for dosin.2 (2/15/2019)   Warfarin maintenance plan:   5 mg (5 mg x 1) every Mon; 7.5 mg (5 mg x 1.5) all other days   Full warfarin instructions:   5 mg every Mon; 7.5 mg all other days   Weekly warfarin total:   50 mg   No change documented:   Diya Orozco RN   Plan last modified:   Tierra Olivarez RN (10/16/2017)   Next INR check:   3/22/2019   Target end date:       Indications    Pulmonary embolism (H) [I26.99]  Long-term (current) use of anticoagulants [Z79.01] [Z79.01]             Anticoagulation Episode Summary     INR check location:       Preferred lab:       Send INR reminders to:   [facility]    Comments:         Anticoagulation Care Providers     Provider Role Specialty Phone number    Oppel, Cedric Waterman PA-C [facility] [phone]            See the Encounter Report to view Anticoagulation Flowsheet and Dosing Calendar (Go to Encounters tab in chart review, and find the Anticoagulation Therapy Visit)        Diya Orozco RN

## 2019-03-22 ENCOUNTER — ANTICOAGULATION THERAPY VISIT (OUTPATIENT)
Dept: NURSING | Facility: CLINIC | Age: 54
End: 2019-03-22
Payer: COMMERCIAL

## 2019-03-22 DIAGNOSIS — I26.99 OTHER PULMONARY EMBOLISM WITHOUT ACUTE COR PULMONALE, UNSPECIFIED CHRONICITY (H): ICD-10-CM

## 2019-03-22 LAB — INR POINT OF CARE: 2.2 (ref 0.86–1.14)

## 2019-03-22 PROCEDURE — 99207 ZZC NO CHARGE NURSE ONLY: CPT

## 2019-03-22 PROCEDURE — 85610 PROTHROMBIN TIME: CPT | Mod: QW

## 2019-03-22 PROCEDURE — 36416 COLLJ CAPILLARY BLOOD SPEC: CPT

## 2019-03-22 NOTE — PROGRESS NOTES
ANTICOAGULATION FOLLOW-UP CLINIC VISIT    Patient Name:  Arslan Clinton  Date:  3/22/2019  Contact Type:  Face to Face    SUBJECTIVE:     Patient Findings     Comments:   The patient was assessed for diet, medication, and activity level changes, missed or extra doses, bruising or bleeding, with no problem findings.  Diya GOODWIN, RN, CPN             OBJECTIVE    INR Protime   Date Value Ref Range Status   2019 2.2 (A) 0.86 - 1.14 Final       ASSESSMENT / PLAN  INR assessment THER    Recheck INR In: 5 WEEKS    INR Location Clinic      Anticoagulation Summary  As of 3/22/2019    INR goal:   2.0-3.0   TTR:   76.8 % (2.9 y)   INR used for dosin.2 (3/22/2019)   Warfarin maintenance plan:   5 mg (5 mg x 1) every Mon; 7.5 mg (5 mg x 1.5) all other days   Full warfarin instructions:   5 mg every Mon; 7.5 mg all other days   Weekly warfarin total:   50 mg   No change documented:   Diya Orozco RN   Plan last modified:   Tierra Olivarez RN (10/16/2017)   Next INR check:   2019   Target end date:       Indications    Pulmonary embolism (H) [I26.99]  Long-term (current) use of anticoagulants [Z79.01] [Z79.01]             Anticoagulation Episode Summary     INR check location:       Preferred lab:       Send INR reminders to:   [facility]    Comments:         Anticoagulation Care Providers     Provider Role Specialty Phone number    Cedric Hodgson PA-C Responsible Physician Assistant [phone]            See the Encounter Report to view Anticoagulation Flowsheet and Dosing Calendar (Go to Encounters tab in chart review, and find the Anticoagulation Therapy Visit)        Diya Orozco RN

## 2019-04-26 ENCOUNTER — ANTICOAGULATION THERAPY VISIT (OUTPATIENT)
Dept: NURSING | Facility: CLINIC | Age: 54
End: 2019-04-26
Payer: COMMERCIAL

## 2019-04-26 DIAGNOSIS — I26.99 OTHER PULMONARY EMBOLISM WITHOUT ACUTE COR PULMONALE, UNSPECIFIED CHRONICITY (H): ICD-10-CM

## 2019-04-26 DIAGNOSIS — I26.99 PULMONARY EMBOLISM (H): ICD-10-CM

## 2019-04-26 LAB — INR POINT OF CARE: 1.5 (ref 0.86–1.14)

## 2019-04-26 PROCEDURE — 85610 PROTHROMBIN TIME: CPT | Mod: QW

## 2019-04-26 PROCEDURE — 36416 COLLJ CAPILLARY BLOOD SPEC: CPT

## 2019-04-26 PROCEDURE — 99207 ZZC NO CHARGE NURSE ONLY: CPT

## 2019-04-26 RX ORDER — WARFARIN SODIUM 5 MG/1
TABLET ORAL
Qty: 135 TABLET | Refills: 0 | Status: SHIPPED | OUTPATIENT
Start: 2019-04-26 | End: 2019-07-05

## 2019-04-26 RX ORDER — WARFARIN SODIUM 5 MG/1
TABLET ORAL
Qty: 135 TABLET | Refills: 0 | Status: SHIPPED | OUTPATIENT
Start: 2019-04-26 | End: 2019-04-26

## 2019-04-26 NOTE — PROGRESS NOTES
ANTICOAGULATION FOLLOW-UP CLINIC VISIT    Patient Name:  Arslan Clinton  Date:  2019  Contact Type:  Face to Face    SUBJECTIVE:     Patient Findings     Positives:   Change in diet/appetite    Comments:   Eating more fruits and vegetables trying to lose weight.           OBJECTIVE    INR Protime   Date Value Ref Range Status   2019 1.5 (A) 0.86 - 1.14 Final       ASSESSMENT / PLAN  INR assessment SUB    Recheck INR In: 2 WEEKS    INR Location Clinic      Anticoagulation Summary  As of 2019    INR goal:   2.0-3.0   TTR:   75.3 % (3 y)   INR used for dosin.5! (2019)   Warfarin maintenance plan:   5 mg (5 mg x 1) every Mon; 7.5 mg (5 mg x 1.5) all other days   Full warfarin instructions:   : 10 mg; : 7.5 mg; /6: 7.5 mg; Otherwise 5 mg every Mon; 7.5 mg all other days   Weekly warfarin total:   50 mg   Plan last modified:   Tierra Olivarez RN (10/16/2017)   Next INR check:   5/10/2019   Target end date:       Indications    Pulmonary embolism (H) [I26.99]  Long-term (current) use of anticoagulants [Z79.01] [Z79.01]             Anticoagulation Episode Summary     INR check location:       Preferred lab:       Send INR reminders to:   [facility]    Comments:         Anticoagulation Care Providers     Provider Role Specialty Phone number    Opjamal, Cedric Waterman PA-C Responsible Physician Assistant [phone]            See the Encounter Report to view Anticoagulation Flowsheet and Dosing Calendar (Go to Encounters tab in chart review, and find the Anticoagulation Therapy Visit)        Monique oTrres RN

## 2019-04-26 NOTE — ADDENDUM NOTE
Addended by: ALLIE MAICAS on: 4/26/2019 07:18 AM     Modules accepted: Level of Service, SmartSet

## 2019-05-10 ENCOUNTER — ANTICOAGULATION THERAPY VISIT (OUTPATIENT)
Dept: NURSING | Facility: CLINIC | Age: 54
End: 2019-05-10
Payer: COMMERCIAL

## 2019-05-10 DIAGNOSIS — I26.99 OTHER PULMONARY EMBOLISM WITHOUT ACUTE COR PULMONALE, UNSPECIFIED CHRONICITY (H): ICD-10-CM

## 2019-05-10 LAB — INR POINT OF CARE: 2.6 (ref 0.86–1.14)

## 2019-05-10 PROCEDURE — 36416 COLLJ CAPILLARY BLOOD SPEC: CPT

## 2019-05-10 PROCEDURE — 85610 PROTHROMBIN TIME: CPT | Mod: QW

## 2019-05-10 PROCEDURE — 99207 ZZC NO CHARGE NURSE ONLY: CPT

## 2019-06-07 ENCOUNTER — ANTICOAGULATION THERAPY VISIT (OUTPATIENT)
Dept: NURSING | Facility: CLINIC | Age: 54
End: 2019-06-07
Payer: COMMERCIAL

## 2019-06-07 DIAGNOSIS — I26.99 OTHER PULMONARY EMBOLISM WITHOUT ACUTE COR PULMONALE, UNSPECIFIED CHRONICITY (H): ICD-10-CM

## 2019-06-07 LAB — INR POINT OF CARE: 2.4 (ref 0.86–1.14)

## 2019-06-07 PROCEDURE — 99207 ZZC NO CHARGE NURSE ONLY: CPT

## 2019-06-07 PROCEDURE — 85610 PROTHROMBIN TIME: CPT | Mod: QW

## 2019-06-07 PROCEDURE — 36416 COLLJ CAPILLARY BLOOD SPEC: CPT

## 2019-06-07 NOTE — PROGRESS NOTES
ANTICOAGULATION FOLLOW-UP CLINIC VISIT    Patient Name:  Arslan Clinton  Date:  2019  Contact Type:  Face to Face    SUBJECTIVE:  Patient Findings     Comments:   The patient was assessed for diet, medication, and activity level changes, missed or extra doses, bruising or bleeding, with no problem findings.          Clinical Outcomes     Negatives:   Major bleeding event, Thromboembolic event, Anticoagulation-related hospital admission, Anticoagulation-related ED visit, Anticoagulation-related fatality    Comments:   The patient was assessed for diet, medication, and activity level changes, missed or extra doses, bruising or bleeding, with no problem findings.             OBJECTIVE    INR Protime   Date Value Ref Range Status   2019 2.4 (A) 0.86 - 1.14 Final       ASSESSMENT / PLAN  INR assessment THER    Recheck INR In: 4 WEEKS    INR Location Clinic      Anticoagulation Summary  As of 2019    INR goal:   2.0-3.0   TTR:   75.6 % (3.1 y)   INR used for dosin.4 (2019)   Warfarin maintenance plan:   5 mg (5 mg x 1) every Mon; 7.5 mg (5 mg x 1.5) all other days   Full warfarin instructions:   5 mg every Mon; 7.5 mg all other days   Weekly warfarin total:   50 mg   No change documented:   Tierra Olivarez RN   Plan last modified:   Tierra Olivarez RN (10/16/2017)   Next INR check:   2019   Target end date:       Indications    Pulmonary embolism (H) [I26.99]  Long-term (current) use of anticoagulants [Z79.01] [Z79.01]             Anticoagulation Episode Summary     INR check location:       Preferred lab:       Send INR reminders to:   [facility]    Comments:         Anticoagulation Care Providers     Provider Role Specialty Phone number    Cedric Hodgson PA-C Responsible Physician Assistant [phone]            See the Encounter Report to view Anticoagulation Flowsheet and Dosing Calendar (Go to Encounters tab in chart review, and find the Anticoagulation Therapy  Visit)        Tierra Olivarez RN

## 2019-07-05 ENCOUNTER — TELEPHONE (OUTPATIENT)
Dept: FAMILY MEDICINE | Facility: CLINIC | Age: 54
End: 2019-07-05

## 2019-07-05 ENCOUNTER — ANTICOAGULATION THERAPY VISIT (OUTPATIENT)
Dept: NURSING | Facility: CLINIC | Age: 54
End: 2019-07-05
Payer: COMMERCIAL

## 2019-07-05 DIAGNOSIS — I26.99 PULMONARY EMBOLISM (H): ICD-10-CM

## 2019-07-05 DIAGNOSIS — I26.99 OTHER PULMONARY EMBOLISM WITHOUT ACUTE COR PULMONALE, UNSPECIFIED CHRONICITY (H): ICD-10-CM

## 2019-07-05 DIAGNOSIS — Z86.718 PERSONAL HISTORY OF VENOUS THROMBOSIS AND EMBOLISM: Primary | ICD-10-CM

## 2019-07-05 DIAGNOSIS — Z79.01 LONG TERM CURRENT USE OF ANTICOAGULANT THERAPY: ICD-10-CM

## 2019-07-05 LAB — INR POINT OF CARE: 2.1 (ref 0.86–1.14)

## 2019-07-05 PROCEDURE — 85610 PROTHROMBIN TIME: CPT | Mod: QW

## 2019-07-05 PROCEDURE — 36416 COLLJ CAPILLARY BLOOD SPEC: CPT

## 2019-07-05 RX ORDER — WARFARIN SODIUM 5 MG/1
TABLET ORAL
Qty: 135 TABLET | Refills: 0 | Status: SHIPPED | OUTPATIENT
Start: 2019-07-05 | End: 2019-10-25

## 2019-07-05 NOTE — PROGRESS NOTES
ANTICOAGULATION FOLLOW-UP CLINIC VISIT    Patient Name:  Arslan Clinton  Date:  2019  Contact Type:  Face to Face    SUBJECTIVE:  Patient Findings     Comments:   The patient was assessed for diet, medication, and activity level changes, missed or extra doses, bruising or bleeding, with no problem findings.          Clinical Outcomes     Negatives:   Major bleeding event, Thromboembolic event, Anticoagulation-related hospital admission, Anticoagulation-related ED visit, Anticoagulation-related fatality    Comments:   The patient was assessed for diet, medication, and activity level changes, missed or extra doses, bruising or bleeding, with no problem findings.             OBJECTIVE    INR Protime   Date Value Ref Range Status   2019 2.1 (A) 0.86 - 1.14 Final       ASSESSMENT / PLAN  INR assessment THER    Recheck INR In: 5 WEEKS    INR Location Clinic      Anticoagulation Summary  As of 2019    INR goal:   2.0-3.0   TTR:   76.2 % (3.2 y)   INR used for dosin.1 (2019)   Warfarin maintenance plan:   5 mg (5 mg x 1) every Mon; 7.5 mg (5 mg x 1.5) all other days   Full warfarin instructions:   5 mg every Mon; 7.5 mg all other days   Weekly warfarin total:   50 mg   No change documented:   Tierra Olivarez RN   Plan last modified:   Tierra Olivarez RN (10/16/2017)   Next INR check:   2019   Target end date:       Indications    Pulmonary embolism (H) [I26.99]  Long-term (current) use of anticoagulants [Z79.01] [Z79.01]             Anticoagulation Episode Summary     INR check location:       Preferred lab:       Send INR reminders to:   LUCY GIBSON    Comments:         Anticoagulation Care Providers     Provider Role Specialty Phone number    Cedric Hodgson PA-C Responsible Physician Assistant [phone]            See the Encounter Report to view Anticoagulation Flowsheet and Dosing Calendar (Go to Encounters tab in chart review, and find the Anticoagulation Therapy  Visit)        Tierra Olivarez RN

## 2019-08-14 ENCOUNTER — TELEPHONE (OUTPATIENT)
Dept: FAMILY MEDICINE | Facility: CLINIC | Age: 54
End: 2019-08-14

## 2019-08-14 NOTE — LETTER
August 15, 2019      Arslan Clinton  [address] [facility] [address]      Dear Arslan,    We are contacting you because our records show you were due for an INR on 8/9/19.      There are potentially serious risks when taking warfarin without careful monitoring, and we want to make sure you are safely managed.    Please call the INR clinic at [phone] and we will be happy to help you schedule an appointment.  If there has been a change in your care, or other concerns, please let us know so we can help and/or update our records.    Sincerely,  Tierra ZAMBRANO  [facility]   [phone]          Cedric Hodgson PA-C

## 2019-08-14 NOTE — TELEPHONE ENCOUNTER
Cancelled last INR and Due to reschedule INR. Call attempt and # listed not in service. Tierra Olivarez RN

## 2019-08-29 ENCOUNTER — TELEPHONE (OUTPATIENT)
Dept: FAMILY MEDICINE | Facility: CLINIC | Age: 54
End: 2019-08-29

## 2019-08-29 NOTE — LETTER
August 29, 2019      Arslan Clinton  [address] [facility] [address]      Dear Arslan,    We are contacting you because our records show you were due for an INR on 8/9/19.      There are potentially serious risks when taking warfarin without careful monitoring, and we want to make sure you are safely managed.    Please call the INR clinic at [phone] and we will be happy to help you schedule an appointment.  If there has been a change in your care, or other concerns, please let us know so we can help and/or update our records.    Sincerely,  Tierra ZAMBRANO  [facility]   [phone]        Cedric Hodgson PA-C

## 2019-08-29 NOTE — TELEPHONE ENCOUNTER
Overdue for INR. Phone # listed not in service. Letter sent 2 wks ago with no response. Will send letter #2. Tierra Olivarez RN

## 2019-09-24 ENCOUNTER — TELEPHONE (OUTPATIENT)
Dept: FAMILY MEDICINE | Facility: CLINIC | Age: 54
End: 2019-09-24

## 2019-09-24 NOTE — TELEPHONE ENCOUNTER
Several attempts have been made and unsuccessful.   Ok to stop. Hopefully patient will follow up  In clinic so to discuss.    Cedric Hodgson PA-C

## 2019-09-24 NOTE — LETTER
September 24, 2019      Arslan [place]  [address] [facility] [address]      Dear Arslan,    We are concerned about your Warfarin Therapy.  You have failed to have the necessary laboratory work required for monitoring your warfarin therapy and you have not responded to the Missed Appointment Letter we recently sent you.    We value you as a patient and seek to provide you with excellent quality of care.  We need to be notified if you have changed physicians or if you are being monitored elsewhere so we can update our files.    If we do not hear from you within 14 days of the date of this letter, we will assume that you no longer are requesting our services and will inactivate you from our warfarin monitoring service.  For any further laboratory work and warfarin dose adjustments you will need to contact your care provider for anticoagulation management.    We want to remind you that there are potential serious risks involved in stopping your warfarin or taking warfarin without careful monitoring.    Please contact our office at [phone] with any information or questions you may have.    Sincerely,  Thank you,  Tierra ZAMBRANO  [facility]   [phone]                  Cedric Hodgson PA-C

## 2019-09-24 NOTE — TELEPHONE ENCOUNTER
Patient cancelled last scheduled INR appointment on 8/9/19.Several letters have been sent to the patient regarding the importance of monitoring. Phone # is no longer in service.   We have sent multiple letters to your patient regarding the importance for monitoring.  As the referring MD please advise if attempts to contact patient should be continued or discharge from anticoagulation services.  If Arslan Clinton is discharged from the [facility] clinic further monitoring and dosing will need to be done by PCP or we will need a new referral.    Please advise.Tierra Olivarez RN

## 2019-10-10 ENCOUNTER — ANTICOAGULATION THERAPY VISIT (OUTPATIENT)
Dept: FAMILY MEDICINE | Facility: CLINIC | Age: 54
End: 2019-10-10

## 2019-10-10 DIAGNOSIS — I26.99 OTHER PULMONARY EMBOLISM WITHOUT ACUTE COR PULMONALE, UNSPECIFIED CHRONICITY (H): ICD-10-CM

## 2019-10-10 DIAGNOSIS — Z79.01 LONG TERM CURRENT USE OF ANTICOAGULANT THERAPY: ICD-10-CM

## 2019-10-25 DIAGNOSIS — I26.99 PULMONARY EMBOLISM (H): ICD-10-CM

## 2019-10-25 RX ORDER — WARFARIN SODIUM 5 MG/1
TABLET ORAL
Qty: 135 TABLET | Refills: 0 | Status: SHIPPED | OUTPATIENT
Start: 2019-10-25

## 2023-03-24 NOTE — PROGRESS NOTES
ANTICOAGULATION FOLLOW-UP CLINIC VISIT    Patient Name:  Arslan Clinton  Date:  5/10/2019  Contact Type:  Face to Face    SUBJECTIVE:         OBJECTIVE    INR Protime   Date Value Ref Range Status   05/10/2019 2.6 (A) 0.86 - 1.14 Final       ASSESSMENT / PLAN  INR assessment THER    Recheck INR In: 4 WEEKS    INR Location Clinic      Anticoagulation Summary  As of 5/10/2019    INR goal:   2.0-3.0   TTR:   75.0 % (3.1 y)   INR used for dosin.6 (5/10/2019)   Warfarin maintenance plan:   5 mg (5 mg x 1) every Mon; 7.5 mg (5 mg x 1.5) all other days   Full warfarin instructions:   5 mg every Mon; 7.5 mg all other days   Weekly warfarin total:   50 mg   No change documented:   Tierra Olivarez RN   Plan last modified:   Tierra Olivarez RN (10/16/2017)   Next INR check:   2019   Target end date:       Indications    Pulmonary embolism (H) [I26.99]  Long-term (current) use of anticoagulants [Z79.01] [Z79.01]             Anticoagulation Episode Summary     INR check location:       Preferred lab:       Send INR reminders to:   [facility]    Comments:         Anticoagulation Care Providers     Provider Role Specialty Phone number    CarrielCedric PA-C Responsible Physician Assistant [phone]            See the Encounter Report to view Anticoagulation Flowsheet and Dosing Calendar (Go to Encounters tab in chart review, and find the Anticoagulation Therapy Visit)        Tierra Olivarez RN                  Not applicable

## (undated) RX ORDER — FENTANYL CITRATE 50 UG/ML
INJECTION, SOLUTION INTRAMUSCULAR; INTRAVENOUS
Status: DISPENSED
Start: 2017-04-28

## (undated) RX ORDER — SODIUM CHLORIDE 9 MG/ML
INJECTION, SOLUTION INTRAVENOUS
Status: DISPENSED
Start: 2017-04-28